# Patient Record
Sex: MALE | Race: OTHER
[De-identification: names, ages, dates, MRNs, and addresses within clinical notes are randomized per-mention and may not be internally consistent; named-entity substitution may affect disease eponyms.]

---

## 2017-03-04 ENCOUNTER — APPOINTMENT (OUTPATIENT)
Dept: FAMILY MEDICINE | Facility: CLINIC | Age: 52
End: 2017-03-04

## 2017-03-04 VITALS
HEIGHT: 66 IN | WEIGHT: 185 LBS | OXYGEN SATURATION: 98 % | DIASTOLIC BLOOD PRESSURE: 93 MMHG | TEMPERATURE: 97.6 F | SYSTOLIC BLOOD PRESSURE: 157 MMHG | HEART RATE: 72 BPM | BODY MASS INDEX: 29.73 KG/M2

## 2017-03-04 VITALS — SYSTOLIC BLOOD PRESSURE: 146 MMHG | DIASTOLIC BLOOD PRESSURE: 98 MMHG

## 2017-03-04 LAB — HBA1C MFR BLD HPLC: 7.2

## 2017-05-04 ENCOUNTER — MEDICATION RENEWAL (OUTPATIENT)
Age: 52
End: 2017-05-04

## 2017-05-15 LAB
ALBUMIN SERPL ELPH-MCNC: 4.6 G/DL
ALP BLD-CCNC: 99 U/L
ALT SERPL-CCNC: 40 U/L
ANION GAP SERPL CALC-SCNC: 15 MMOL/L
AST SERPL-CCNC: 26 U/L
BILIRUB SERPL-MCNC: 0.3 MG/DL
BUN SERPL-MCNC: 26 MG/DL
CALCIUM SERPL-MCNC: 9.6 MG/DL
CHLORIDE SERPL-SCNC: 99 MMOL/L
CO2 SERPL-SCNC: 24 MMOL/L
CREAT SERPL-MCNC: 0.99 MG/DL
GLUCOSE SERPL-MCNC: 93 MG/DL
POTASSIUM SERPL-SCNC: 3.9 MMOL/L
PROT SERPL-MCNC: 7.7 G/DL
SODIUM SERPL-SCNC: 138 MMOL/L

## 2017-06-06 ENCOUNTER — MEDICATION RENEWAL (OUTPATIENT)
Age: 52
End: 2017-06-06

## 2017-06-09 ENCOUNTER — RESULT CHARGE (OUTPATIENT)
Age: 52
End: 2017-06-09

## 2017-06-10 ENCOUNTER — APPOINTMENT (OUTPATIENT)
Dept: FAMILY MEDICINE | Facility: CLINIC | Age: 52
End: 2017-06-10

## 2017-06-10 ENCOUNTER — MED ADMIN CHARGE (OUTPATIENT)
Age: 52
End: 2017-06-10

## 2017-06-10 VITALS
DIASTOLIC BLOOD PRESSURE: 86 MMHG | WEIGHT: 184 LBS | OXYGEN SATURATION: 98 % | BODY MASS INDEX: 29.57 KG/M2 | TEMPERATURE: 98.4 F | SYSTOLIC BLOOD PRESSURE: 127 MMHG | HEART RATE: 72 BPM | HEIGHT: 66 IN

## 2017-06-12 LAB
25(OH)D3 SERPL-MCNC: 18.8 NG/ML
CHOLEST SERPL-MCNC: 179 MG/DL
CHOLEST/HDLC SERPL: 4.8 RATIO
HDLC SERPL-MCNC: 37 MG/DL
LDLC SERPL CALC-MCNC: 129 MG/DL
TRIGL SERPL-MCNC: 67 MG/DL

## 2017-08-07 ENCOUNTER — RX RENEWAL (OUTPATIENT)
Age: 52
End: 2017-08-07

## 2017-09-16 ENCOUNTER — APPOINTMENT (OUTPATIENT)
Dept: FAMILY MEDICINE | Facility: CLINIC | Age: 52
End: 2017-09-16
Payer: COMMERCIAL

## 2017-09-16 ENCOUNTER — RESULT CHARGE (OUTPATIENT)
Age: 52
End: 2017-09-16

## 2017-09-16 VITALS
SYSTOLIC BLOOD PRESSURE: 150 MMHG | HEIGHT: 66 IN | DIASTOLIC BLOOD PRESSURE: 92 MMHG | WEIGHT: 189 LBS | HEART RATE: 77 BPM | BODY MASS INDEX: 30.37 KG/M2 | TEMPERATURE: 98.3 F | OXYGEN SATURATION: 96 %

## 2017-09-16 LAB — HBA1C MFR BLD HPLC: 7.1

## 2017-09-16 PROCEDURE — 99213 OFFICE O/P EST LOW 20 MIN: CPT | Mod: 25

## 2017-09-16 PROCEDURE — 83036 HEMOGLOBIN GLYCOSYLATED A1C: CPT | Mod: QW

## 2017-10-09 ENCOUNTER — RX RENEWAL (OUTPATIENT)
Age: 52
End: 2017-10-09

## 2017-11-02 ENCOUNTER — RESULT CHARGE (OUTPATIENT)
Age: 52
End: 2017-11-02

## 2017-11-03 ENCOUNTER — APPOINTMENT (OUTPATIENT)
Dept: FAMILY MEDICINE | Facility: CLINIC | Age: 52
End: 2017-11-03
Payer: COMMERCIAL

## 2017-11-03 VITALS
HEIGHT: 66 IN | HEART RATE: 71 BPM | BODY MASS INDEX: 30.22 KG/M2 | TEMPERATURE: 98 F | SYSTOLIC BLOOD PRESSURE: 135 MMHG | OXYGEN SATURATION: 96 % | WEIGHT: 188 LBS | DIASTOLIC BLOOD PRESSURE: 87 MMHG

## 2017-11-03 DIAGNOSIS — Z92.29 PERSONAL HISTORY OF OTHER DRUG THERAPY: ICD-10-CM

## 2017-11-03 PROCEDURE — 99396 PREV VISIT EST AGE 40-64: CPT | Mod: 25

## 2017-11-03 PROCEDURE — 93000 ELECTROCARDIOGRAM COMPLETE: CPT

## 2017-11-03 PROCEDURE — 90471 IMMUNIZATION ADMIN: CPT

## 2017-11-03 PROCEDURE — 90715 TDAP VACCINE 7 YRS/> IM: CPT

## 2017-11-03 PROCEDURE — 36415 COLL VENOUS BLD VENIPUNCTURE: CPT

## 2017-11-06 ENCOUNTER — MEDICATION RENEWAL (OUTPATIENT)
Age: 52
End: 2017-11-06

## 2017-11-06 LAB
25(OH)D3 SERPL-MCNC: 19.8 NG/ML
ALBUMIN SERPL ELPH-MCNC: 4.6 G/DL
ALP BLD-CCNC: 92 U/L
ALT SERPL-CCNC: 31 U/L
ANION GAP SERPL CALC-SCNC: 16 MMOL/L
APPEARANCE: CLEAR
AST SERPL-CCNC: 23 U/L
BACTERIA: NEGATIVE
BASOPHILS # BLD AUTO: 0.02 K/UL
BASOPHILS NFR BLD AUTO: 0.3 %
BILIRUB SERPL-MCNC: 0.4 MG/DL
BILIRUBIN URINE: NEGATIVE
BLOOD URINE: NEGATIVE
BUN SERPL-MCNC: 23 MG/DL
CALCIUM SERPL-MCNC: 10.2 MG/DL
CHLORIDE SERPL-SCNC: 101 MMOL/L
CHOLEST SERPL-MCNC: 219 MG/DL
CHOLEST/HDLC SERPL: 6.3 RATIO
CO2 SERPL-SCNC: 22 MMOL/L
COLOR: YELLOW
CREAT SERPL-MCNC: 1.06 MG/DL
EOSINOPHIL # BLD AUTO: 0.17 K/UL
EOSINOPHIL NFR BLD AUTO: 2.9 %
GLUCOSE QUALITATIVE U: NEGATIVE MG/DL
GLUCOSE SERPL-MCNC: 122 MG/DL
HCT VFR BLD CALC: 44.3 %
HDLC SERPL-MCNC: 35 MG/DL
HGB BLD-MCNC: 15 G/DL
HIV1+2 AB SPEC QL IA.RAPID: NONREACTIVE
HYALINE CASTS: 1 /LPF
IMM GRANULOCYTES NFR BLD AUTO: 0 %
KETONES URINE: NEGATIVE
LDLC SERPL CALC-MCNC: 166 MG/DL
LEUKOCYTE ESTERASE URINE: NEGATIVE
LYMPHOCYTES # BLD AUTO: 2.21 K/UL
LYMPHOCYTES NFR BLD AUTO: 37.6 %
MAN DIFF?: NORMAL
MCHC RBC-ENTMCNC: 29.7 PG
MCHC RBC-ENTMCNC: 33.9 GM/DL
MCV RBC AUTO: 87.7 FL
MICROSCOPIC-UA: NORMAL
MONOCYTES # BLD AUTO: 0.46 K/UL
MONOCYTES NFR BLD AUTO: 7.8 %
NEUTROPHILS # BLD AUTO: 3.02 K/UL
NEUTROPHILS NFR BLD AUTO: 51.4 %
NITRITE URINE: NEGATIVE
PH URINE: 5
PLATELET # BLD AUTO: 166 K/UL
POTASSIUM SERPL-SCNC: 4.2 MMOL/L
PROT SERPL-MCNC: 8 G/DL
PROTEIN URINE: NEGATIVE MG/DL
PSA SERPL-MCNC: 0.38 NG/ML
RBC # BLD: 5.05 M/UL
RBC # FLD: 12.9 %
RED BLOOD CELLS URINE: 1 /HPF
SODIUM SERPL-SCNC: 139 MMOL/L
SPECIFIC GRAVITY URINE: 1.02
SQUAMOUS EPITHELIAL CELLS: 0 /HPF
TRIGL SERPL-MCNC: 89 MG/DL
TSH SERPL-ACNC: 1.5 UIU/ML
UROBILINOGEN URINE: NEGATIVE MG/DL
WBC # FLD AUTO: 5.88 K/UL
WHITE BLOOD CELLS URINE: 0 /HPF

## 2017-11-20 ENCOUNTER — RX RENEWAL (OUTPATIENT)
Age: 52
End: 2017-11-20

## 2018-02-12 ENCOUNTER — RX RENEWAL (OUTPATIENT)
Age: 53
End: 2018-02-12

## 2018-03-12 ENCOUNTER — APPOINTMENT (OUTPATIENT)
Dept: FAMILY MEDICINE | Facility: CLINIC | Age: 53
End: 2018-03-12
Payer: COMMERCIAL

## 2018-03-12 VITALS
WEIGHT: 184 LBS | TEMPERATURE: 97.9 F | DIASTOLIC BLOOD PRESSURE: 89 MMHG | OXYGEN SATURATION: 97 % | HEART RATE: 87 BPM | SYSTOLIC BLOOD PRESSURE: 130 MMHG | HEIGHT: 66 IN | BODY MASS INDEX: 29.57 KG/M2

## 2018-03-12 PROCEDURE — 36415 COLL VENOUS BLD VENIPUNCTURE: CPT

## 2018-03-12 PROCEDURE — 99213 OFFICE O/P EST LOW 20 MIN: CPT | Mod: 25

## 2018-03-12 RX ORDER — TADALAFIL 5 MG/1
5 TABLET, FILM COATED ORAL
Qty: 30 | Refills: 6 | Status: COMPLETED | COMMUNITY
Start: 2017-11-03 | End: 2018-03-12

## 2018-03-22 LAB
25(OH)D3 SERPL-MCNC: 28.8 NG/ML
ALBUMIN SERPL ELPH-MCNC: 4.8 G/DL
ALP BLD-CCNC: 86 U/L
ALT SERPL-CCNC: 26 U/L
ANION GAP SERPL CALC-SCNC: 18 MMOL/L
AST SERPL-CCNC: 23 U/L
BILIRUB SERPL-MCNC: 0.3 MG/DL
BUN SERPL-MCNC: 18 MG/DL
CALCIUM SERPL-MCNC: 10.4 MG/DL
CHLORIDE SERPL-SCNC: 100 MMOL/L
CO2 SERPL-SCNC: 23 MMOL/L
CREAT SERPL-MCNC: 1.08 MG/DL
GLUCOSE SERPL-MCNC: 99 MG/DL
POTASSIUM SERPL-SCNC: 4.2 MMOL/L
PROT SERPL-MCNC: 7.9 G/DL
SODIUM SERPL-SCNC: 141 MMOL/L

## 2018-06-09 ENCOUNTER — RX RENEWAL (OUTPATIENT)
Age: 53
End: 2018-06-09

## 2018-10-13 ENCOUNTER — APPOINTMENT (OUTPATIENT)
Dept: FAMILY MEDICINE | Facility: CLINIC | Age: 53
End: 2018-10-13

## 2018-12-14 ENCOUNTER — APPOINTMENT (OUTPATIENT)
Dept: FAMILY MEDICINE | Facility: CLINIC | Age: 53
End: 2018-12-14
Payer: COMMERCIAL

## 2018-12-14 VITALS
OXYGEN SATURATION: 99 % | SYSTOLIC BLOOD PRESSURE: 156 MMHG | TEMPERATURE: 98.2 F | BODY MASS INDEX: 29.09 KG/M2 | HEIGHT: 66 IN | DIASTOLIC BLOOD PRESSURE: 116 MMHG | WEIGHT: 181 LBS | HEART RATE: 69 BPM

## 2018-12-14 VITALS — SYSTOLIC BLOOD PRESSURE: 151 MMHG | DIASTOLIC BLOOD PRESSURE: 97 MMHG

## 2018-12-14 PROCEDURE — 99213 OFFICE O/P EST LOW 20 MIN: CPT

## 2019-01-23 ENCOUNTER — APPOINTMENT (OUTPATIENT)
Dept: FAMILY MEDICINE | Facility: CLINIC | Age: 54
End: 2019-01-23
Payer: COMMERCIAL

## 2019-01-23 VITALS
SYSTOLIC BLOOD PRESSURE: 143 MMHG | WEIGHT: 181 LBS | TEMPERATURE: 98.4 F | HEIGHT: 66 IN | HEART RATE: 65 BPM | BODY MASS INDEX: 29.09 KG/M2 | DIASTOLIC BLOOD PRESSURE: 93 MMHG | OXYGEN SATURATION: 98 %

## 2019-01-23 DIAGNOSIS — Z87.09 PERSONAL HISTORY OF OTHER DISEASES OF THE RESPIRATORY SYSTEM: ICD-10-CM

## 2019-01-23 DIAGNOSIS — B35.3 TINEA PEDIS: ICD-10-CM

## 2019-01-23 DIAGNOSIS — Z87.19 PERSONAL HISTORY OF OTHER DISEASES OF THE DIGESTIVE SYSTEM: ICD-10-CM

## 2019-01-23 DIAGNOSIS — Z23 ENCOUNTER FOR IMMUNIZATION: ICD-10-CM

## 2019-01-23 LAB — HBA1C MFR BLD HPLC: 8.8

## 2019-01-23 PROCEDURE — G0405: CPT

## 2019-01-23 PROCEDURE — 93000 ELECTROCARDIOGRAM COMPLETE: CPT | Mod: 59

## 2019-01-23 PROCEDURE — 99396 PREV VISIT EST AGE 40-64: CPT | Mod: 25

## 2019-01-23 PROCEDURE — 83036 HEMOGLOBIN GLYCOSYLATED A1C: CPT | Mod: QW

## 2019-01-23 PROCEDURE — 36415 COLL VENOUS BLD VENIPUNCTURE: CPT

## 2019-01-23 RX ORDER — LOSARTAN POTASSIUM AND HYDROCHLOROTHIAZIDE 12.5; 5 MG/1; MG/1
50-12.5 TABLET ORAL DAILY
Qty: 30 | Refills: 3 | Status: DISCONTINUED | COMMUNITY
Start: 2017-03-04 | End: 2019-01-23

## 2019-01-23 RX ORDER — TERBINAFINE HYDROCHLORIDE 250 MG/1
250 TABLET ORAL
Qty: 30 | Refills: 1 | Status: COMPLETED | COMMUNITY
Start: 2017-11-03 | End: 2019-01-23

## 2019-01-23 RX ORDER — GUAIFENESIN AND DEXTROMETHORPHAN HYDROBROMIDE 200; 10 MG/5ML; MG/5ML
10-200 LIQUID ORAL 4 TIMES DAILY
Qty: 1 | Refills: 1 | Status: COMPLETED | COMMUNITY
Start: 2018-12-14 | End: 2019-01-23

## 2019-01-23 NOTE — ASSESSMENT
[FreeTextEntry1] : This is a 52 y/o male with PMHx including HTN, HLD, DM2 uncontrolled, IRBBB, Vitamin D insufficiency, presenting to the office for CPE\par \par CVS: h/o HTN, HLD, IRBBB\par -Blood pressure elevated, did not take meds today.\par -Formerly on Losartan 50/12.5,switched to Irbesartan secondary to Losartan recall, Pravastatin 10 mg\par -Diet and exercise discussed.\par \par Endo: h/o DM2 uncontrolled\par -HgbA1c 8.8 today\par -Pt was only taking Pioglitazone/Metformin once daily instead of bid, encouraged to follow up with instructions.\par -Diet and exercise discussed\par -Blood sugar monitoring at least 3 x a week.\par \par : h/o erectile dysfunction\par -Renewed Sildenafil 20 mg\par -Will check Testosterone level and Prolactin.\par \par HCM:\par -Has not had Flu vaccine yet, encouraged to get it at his local pharmacy\par -Fasting blood work today\par -Received Tetanus Vaccine 11/17\par -Received Pneumonia Vaccine 6/17 (pneumovax)\par -Colonoscopy 9/2016, recommend repeat 2026\par -Ophthalmology referral given for diabetic eye exam\par -Cardiology referral for disease stratification (diabetes /Htn/ Abnormal EKG)\par

## 2019-01-23 NOTE — PHYSICAL EXAM
[No Acute Distress] : no acute distress [Well Nourished] : well nourished [Well Developed] : well developed [Well-Appearing] : well-appearing [Normal Nasal Mucosa] : the nasal mucosa was normal [No JVD] : no jugular venous distention [Supple] : supple [No Lymphadenopathy] : no lymphadenopathy [No Respiratory Distress] : no respiratory distress  [Clear to Auscultation] : lungs were clear to auscultation bilaterally [No Accessory Muscle Use] : no accessory muscle use [Normal Rate] : normal rate  [Regular Rhythm] : with a regular rhythm [Normal S1, S2] : normal S1 and S2 [No Murmur] : no murmur heard [Soft] : abdomen soft [Normal Bowel Sounds] : normal bowel sounds [de-identified] : Nares erythematous and boggy, some purulent rhinorrhea seen. Posterior pharynx slightly erythematous, no tonsillar enlargement

## 2019-01-23 NOTE — COUNSELING
[Weight management counseling provided] : Weight management [Healthy eating counseling provided] : healthy eating [Activity counseling provided] : activity [Target Wt Loss Goal ___] : Target weight loss goal [unfilled] lbs [___ min/wk activity recommended] : [unfilled] min/wk activity recommended [None] : None [Good understanding] : Patient has a good understanding of lifestyle changes and the steps needed to achieve self management goals

## 2019-01-23 NOTE — REVIEW OF SYSTEMS
[Fever] : no fever [Chills] : no chills [Night Sweats] : no night sweats [Postnasal Drip] : postnasal drip [Nasal Discharge] : nasal discharge [Wheezing] : wheezing [Cough] : cough [Negative] : Musculoskeletal

## 2019-01-23 NOTE — HISTORY OF PRESENT ILLNESS
[FreeTextEntry8] : 1 1/2 mo h/o cough with mild productive sputum, + wheezing, + sick contacts at home. Has tried OTC cough syrup. Pt returned from [facility] 11/11/18 he was there for 3 weeks after the death of his father.

## 2019-01-23 NOTE — ASSESSMENT
[FreeTextEntry1] : Will start him on azithromycin for 5 days, diabetic cough syrup.\par \par Blood pressure consistently elevated, will start Irbesartan 150 mg daily.\par Diet and exercise discussed for better glycemic control\par \par Renew Pravastatin, Pioglitazone, Vitamin D \par \par RTO for CPE at earliest convenience

## 2019-01-23 NOTE — HISTORY OF PRESENT ILLNESS
[FreeTextEntry1] : Physical exam [de-identified] : This is a 52 y/o male with PMHx including HTN, HLD, DM2 uncontrolled, IRBBB, Vitamin D insufficiency, presenting to the office for CPE, no acute complains.

## 2019-01-23 NOTE — HEALTH RISK ASSESSMENT
[Good] : ~his/her~  mood as  good [No falls in past year] : Patient reported no falls in the past year [0] : 2) Feeling down, depressed, or hopeless: Not at all (0) [Hepatitis C test offered] : Hepatitis C test offered [None] : None [With Family] : lives with family [# of Members in Household ___] :  household currently consist of [unfilled] member(s) [Employed] : employed [High School] : high school [] :  [# Of Children ___] : has [unfilled] children [Sexually Active] : sexually active [Feels Safe at Home] : Feels safe at home [Fully functional (bathing, dressing, toileting, transferring, walking, feeding)] : Fully functional (bathing, dressing, toileting, transferring, walking, feeding) [Fully functional (using the telephone, shopping, preparing meals, housekeeping, doing laundry, using] : Fully functional and needs no help or supervision to perform IADLs (using the telephone, shopping, preparing meals, housekeeping, doing laundry, using transportation, managing medications and managing finances) [Smoke Detector] : smoke detector [Carbon Monoxide Detector] : carbon monoxide detector [Seat Belt] :  uses seat belt [Travel to Developing Areas] : travel to developing areas [Discussed at today's visit] : Advance Directives Discussed at today's visit [] : No [[id number]] : 0 [Change in mental status noted] : No change in mental status noted [Language] : denies difficulty with language [Behavior] : denies difficulty with behavior [Learning/Retaining New Information] : denies difficulty learning/retaining new information [Handling Complex Tasks] : denies difficulty handling complex tasks [Reasoning] : denies difficulty with reasoning [Spatial Ability and Orientation] : denies difficulty with spatial ability and orientation [High Risk Behavior] : no high risk behavior [Reports changes in hearing] : Reports no changes in hearing [Reports changes in vision] : Reports no changes in vision [Reports changes in dental health] : Reports no changes in dental health [Guns at Home] : no guns at home [Sunscreen] : does not use sunscreen [TB Exposure] : is not being exposed to tuberculosis [Caregiver Concerns] : does not have caregiver concerns [ColonoscopyDate] : 09/2016 [ColonoscopyComments] : Report in EMR, return in 10 years [HIVDate] : 11/17 [de-identified] : full time [FreeTextEntry2] :  AND

## 2019-01-24 LAB
ALBUMIN SERPL ELPH-MCNC: 4.9 G/DL
ALP BLD-CCNC: 106 U/L
ALT SERPL-CCNC: 63 U/L
ANION GAP SERPL CALC-SCNC: 12 MMOL/L
APPEARANCE: CLEAR
AST SERPL-CCNC: 38 U/L
BACTERIA: NEGATIVE
BASOPHILS # BLD AUTO: 0.03 K/UL
BASOPHILS NFR BLD AUTO: 0.5 %
BILIRUB SERPL-MCNC: 0.4 MG/DL
BILIRUBIN URINE: NEGATIVE
BLOOD URINE: NEGATIVE
BUN SERPL-MCNC: 17 MG/DL
CALCIUM SERPL-MCNC: 10.5 MG/DL
CHLORIDE SERPL-SCNC: 98 MMOL/L
CHOLEST SERPL-MCNC: 162 MG/DL
CHOLEST/HDLC SERPL: 4 RATIO
CO2 SERPL-SCNC: 28 MMOL/L
COLOR: YELLOW
CREAT SERPL-MCNC: 1.01 MG/DL
EOSINOPHIL # BLD AUTO: 0.23 K/UL
EOSINOPHIL NFR BLD AUTO: 3.9 %
GLUCOSE QUALITATIVE U: NEGATIVE MG/DL
GLUCOSE SERPL-MCNC: 150 MG/DL
HCT VFR BLD CALC: 44.8 %
HCV AB SER QL: NONREACTIVE
HCV S/CO RATIO: 0.07 S/CO
HDLC SERPL-MCNC: 41 MG/DL
HGB BLD-MCNC: 15.4 G/DL
HYALINE CASTS: 0 /LPF
IMM GRANULOCYTES NFR BLD AUTO: 0.2 %
KETONES URINE: NEGATIVE
LDLC SERPL CALC-MCNC: 104 MG/DL
LEUKOCYTE ESTERASE URINE: NEGATIVE
LYMPHOCYTES # BLD AUTO: 2.14 K/UL
LYMPHOCYTES NFR BLD AUTO: 36.5 %
MAN DIFF?: NORMAL
MCHC RBC-ENTMCNC: 29.3 PG
MCHC RBC-ENTMCNC: 34.4 GM/DL
MCV RBC AUTO: 85.2 FL
MICROSCOPIC-UA: NORMAL
MONOCYTES # BLD AUTO: 0.38 K/UL
MONOCYTES NFR BLD AUTO: 6.5 %
NEUTROPHILS # BLD AUTO: 3.07 K/UL
NEUTROPHILS NFR BLD AUTO: 52.4 %
NITRITE URINE: NEGATIVE
PH URINE: 6
PLATELET # BLD AUTO: 162 K/UL
POTASSIUM SERPL-SCNC: 4.3 MMOL/L
PROT SERPL-MCNC: 8 G/DL
PROTEIN URINE: NEGATIVE MG/DL
PSA SERPL-MCNC: 0.3 NG/ML
RBC # BLD: 5.26 M/UL
RBC # FLD: 13.6 %
RED BLOOD CELLS URINE: 1 /HPF
SODIUM SERPL-SCNC: 138 MMOL/L
SPECIFIC GRAVITY URINE: 1.02
SQUAMOUS EPITHELIAL CELLS: 0 /HPF
TESTOST SERPL-MCNC: 705.9 NG/DL
TRIGL SERPL-MCNC: 83 MG/DL
TSH SERPL-ACNC: 1.68 UIU/ML
UROBILINOGEN URINE: NEGATIVE MG/DL
WBC # FLD AUTO: 5.86 K/UL
WHITE BLOOD CELLS URINE: 1 /HPF

## 2019-01-25 LAB
25(OH)D3 SERPL-MCNC: 27.6 NG/ML
PROLACTIN SERPL-MCNC: 8.6 NG/ML

## 2019-02-02 ENCOUNTER — MEDICATION RENEWAL (OUTPATIENT)
Age: 54
End: 2019-02-02

## 2019-02-15 ENCOUNTER — APPOINTMENT (OUTPATIENT)
Dept: CARDIOLOGY | Facility: CLINIC | Age: 54
End: 2019-02-15

## 2019-03-19 ENCOUNTER — NON-APPOINTMENT (OUTPATIENT)
Age: 54
End: 2019-03-19

## 2019-03-19 ENCOUNTER — APPOINTMENT (OUTPATIENT)
Dept: CARDIOLOGY | Facility: CLINIC | Age: 54
End: 2019-03-19
Payer: COMMERCIAL

## 2019-03-19 VITALS
DIASTOLIC BLOOD PRESSURE: 93 MMHG | SYSTOLIC BLOOD PRESSURE: 160 MMHG | HEART RATE: 69 BPM | HEIGHT: 66 IN | OXYGEN SATURATION: 98 % | BODY MASS INDEX: 30.86 KG/M2 | WEIGHT: 192 LBS

## 2019-03-19 VITALS — SYSTOLIC BLOOD PRESSURE: 143 MMHG | DIASTOLIC BLOOD PRESSURE: 93 MMHG

## 2019-03-19 PROCEDURE — 99245 OFF/OP CONSLTJ NEW/EST HI 55: CPT

## 2019-03-19 PROCEDURE — 93000 ELECTROCARDIOGRAM COMPLETE: CPT

## 2019-03-19 RX ORDER — IRBESARTAN 150 MG/1
150 TABLET ORAL DAILY
Qty: 30 | Refills: 3 | Status: DISCONTINUED | COMMUNITY
Start: 2018-12-14 | End: 2019-03-19

## 2019-03-19 NOTE — REVIEW OF SYSTEMS
[see HPI] : see HPI [Dizziness] : dizziness [Tremor] : no tremor was seen [Numbness (Hypesthesia)] : no numbness [Convulsions] : no convulsions [Tingling (Paresthesia)] : no tingling [Negative] : Heme/Lymph

## 2019-03-19 NOTE — DISCUSSION/SUMMARY
[Patient] : the patient [Risks] : risks [Benefits] : benefits [Alternatives] : alternatives [___ Month(s)] : [unfilled] month(s) [With Me] : with me [FreeTextEntry1] : This is a 53 year old male with hypertension and dyslipidemia and Diabetes Mellitus here for coronary artery disease evaluation\par 1) coronary artery disease evaluation: Risk factors for coronary artery disease . stress echo, 2D echo. aspirin 81 mg daily. \par 2) dizziness: carotid  ultrasound. 2D echo,. \par 3) dyslipidemia : ct statins. LDL : reviewed. pravastatin 10 mg bedtime. \par 4) hypertension: uncontrolled.  increase the losartan-HCTZ to 100/25 mg daily. restrict salt intake.

## 2019-03-19 NOTE — REASON FOR VISIT
[Initial Evaluation] : an initial evaluation of [FreeTextEntry2] : coronary artery disease evaluation, hypertension and dyslipidemia , Diabetes Mellitus  [FreeTextEntry1] : coronary artery disease evaluation, hypertension and dyslipidemia , Diabetes Mellitus

## 2019-03-19 NOTE — HISTORY OF PRESENT ILLNESS
[FreeTextEntry1] : coronary artery disease evaluation, hypertension and dyslipidemia , Diabetes Mellitus \par \par This is a 53 year old male with history of hypertension and dyslipidemia and Diabetes Mellitus here with dizziness and coronary artery disease evaluation.\par \par patient has been having intermittent dizziness, not every day some times. no syncope. HE thinks its his high blood pressure. \par No ches tpain. no dyspnea. he mentions that his primary doctor is concernves about his cardiac status and want him to be evaluated for coronary artery disease .\par he does not exercise, bnut he is active and has 2  jobs. Sleeps 5 hrs a night.\par

## 2019-03-19 NOTE — PHYSICAL EXAM
[General Appearance - Well Developed] : well developed [Well Groomed] : well groomed [Normal Appearance] : normal appearance [General Appearance - Well Nourished] : well nourished [No Deformities] : no deformities [General Appearance - In No Acute Distress] : no acute distress [Normal Conjunctiva] : the conjunctiva exhibited no abnormalities [Normal Oral Mucosa] : normal oral mucosa [Eyelids - No Xanthelasma] : the eyelids demonstrated no xanthelasmas [No Oral Pallor] : no oral pallor [No Oral Cyanosis] : no oral cyanosis [Normal Jugular Venous A Waves Present] : normal jugular venous A waves present [Normal Jugular Venous V Waves Present] : normal jugular venous V waves present [No Jugular Venous Coronel A Waves] : no jugular venous coronel A waves [Heart Rate And Rhythm] : heart rate and rhythm were normal [Heart Sounds] : normal S1 and S2 [Murmurs] : no murmurs present [Respiration, Rhythm And Depth] : normal respiratory rhythm and effort [Exaggerated Use Of Accessory Muscles For Inspiration] : no accessory muscle use [Auscultation Breath Sounds / Voice Sounds] : lungs were clear to auscultation bilaterally [Abdomen Tenderness] : non-tender [Abdomen Soft] : soft [Abdomen Mass (___ Cm)] : no abdominal mass palpated [Abnormal Walk] : normal gait [Gait - Sufficient For Exercise Testing] : the gait was sufficient for exercise testing [Nail Clubbing] : no clubbing of the fingernails [Cyanosis, Localized] : no localized cyanosis [Petechial Hemorrhages (___cm)] : no petechial hemorrhages [Skin Color & Pigmentation] : normal skin color and pigmentation [] : no rash [No Venous Stasis] : no venous stasis [Skin Lesions] : no skin lesions [No Skin Ulcers] : no skin ulcer [No Xanthoma] : no  xanthoma was observed [Oriented To Time, Place, And Person] : oriented to person, place, and time [Affect] : the affect was normal [Mood] : the mood was normal [No Anxiety] : not feeling anxious

## 2019-03-22 ENCOUNTER — CLINICAL ADVICE (OUTPATIENT)
Age: 54
End: 2019-03-22

## 2019-03-29 ENCOUNTER — APPOINTMENT (OUTPATIENT)
Dept: CARDIOLOGY | Facility: CLINIC | Age: 54
End: 2019-03-29
Payer: COMMERCIAL

## 2019-03-29 PROCEDURE — 93306 TTE W/DOPPLER COMPLETE: CPT

## 2019-03-29 PROCEDURE — 93880 EXTRACRANIAL BILAT STUDY: CPT

## 2019-04-04 ENCOUNTER — FORM ENCOUNTER (OUTPATIENT)
Age: 54
End: 2019-04-04

## 2019-04-05 ENCOUNTER — OUTPATIENT (OUTPATIENT)
Dept: OUTPATIENT SERVICES | Facility: HOSPITAL | Age: 54
LOS: 1 days | End: 2019-04-05
Payer: COMMERCIAL

## 2019-04-05 DIAGNOSIS — E78.5 HYPERLIPIDEMIA, UNSPECIFIED: ICD-10-CM

## 2019-04-05 DIAGNOSIS — I10 ESSENTIAL (PRIMARY) HYPERTENSION: ICD-10-CM

## 2019-04-05 DIAGNOSIS — I20.8 OTHER FORMS OF ANGINA PECTORIS: ICD-10-CM

## 2019-04-05 DIAGNOSIS — E11.9 TYPE 2 DIABETES MELLITUS WITHOUT COMPLICATIONS: ICD-10-CM

## 2019-04-05 LAB
BUN SERPL-MCNC: 22 MG/DL — HIGH (ref 8–20)
CREAT SERPL-MCNC: 0.94 MG/DL — SIGNIFICANT CHANGE UP (ref 0.5–1.3)

## 2019-04-05 PROCEDURE — 75574 CT ANGIO HRT W/3D IMAGE: CPT

## 2019-04-05 PROCEDURE — 36415 COLL VENOUS BLD VENIPUNCTURE: CPT

## 2019-04-05 PROCEDURE — 84520 ASSAY OF UREA NITROGEN: CPT

## 2019-04-05 PROCEDURE — 75574 CT ANGIO HRT W/3D IMAGE: CPT | Mod: 26

## 2019-04-05 PROCEDURE — 82565 ASSAY OF CREATININE: CPT

## 2019-04-13 ENCOUNTER — APPOINTMENT (OUTPATIENT)
Dept: FAMILY MEDICINE | Facility: CLINIC | Age: 54
End: 2019-04-13
Payer: COMMERCIAL

## 2019-04-13 VITALS
DIASTOLIC BLOOD PRESSURE: 86 MMHG | TEMPERATURE: 98 F | WEIGHT: 188 LBS | HEIGHT: 66 IN | SYSTOLIC BLOOD PRESSURE: 125 MMHG | BODY MASS INDEX: 30.22 KG/M2 | OXYGEN SATURATION: 99 % | HEART RATE: 65 BPM

## 2019-04-13 DIAGNOSIS — Z86.19 PERSONAL HISTORY OF OTHER INFECTIOUS AND PARASITIC DISEASES: ICD-10-CM

## 2019-04-13 PROCEDURE — 99214 OFFICE O/P EST MOD 30 MIN: CPT | Mod: 25

## 2019-04-13 PROCEDURE — 83036 HEMOGLOBIN GLYCOSYLATED A1C: CPT | Mod: QW

## 2019-04-13 RX ORDER — METOPROLOL TARTRATE 25 MG/1
25 TABLET, FILM COATED ORAL
Qty: 2 | Refills: 0 | Status: COMPLETED | COMMUNITY
Start: 2019-03-19 | End: 2019-04-13

## 2019-04-13 NOTE — ASSESSMENT
[FreeTextEntry1] : This is a 52 y/o male with PMHx including HTN, HLD, DM2 uncontrolled, IRBBB, Vitamin D insufficiency, presenting to the office for Diabetes follow up.\par \par CVS: h/o HTN, HLD, IRBBB\par -Blood pressure well controlled.\par -Losartan increased to 100/25 by Cardiology Dr Zapata.\par -Continue Pravastatin 10 mg\par -Pt underwent CT Angio on 4/5/19, found with nml coronaries, Calcium score 0.25\par -Echocardiogram performed March 2019, EF 57%, no significant valvular abnormality, Carotid Doppler with no significant stenosis.\par -Diet and exercise discussed.\par \par Endo: h/o DM2 uncontrolled\par -HgbA1c 8.6 today,down from 8.8\par -Pt taking Pioglitazone/Metformin bid, will add Jardiance 25 mg daily.\par -Diet and exercise discussed\par -Blood sugar monitoring at least 3 x a week.\par \par : h/o erectile dysfunction\par -Renewed Sildenafil 20 mg\par -Testosterone level and Prolactin levels wnl.\par \par HCM:\par -Did not get flu vaccine.\par -Received Tetanus Vaccine 11/17\par -Received Pneumonia Vaccine 6/17 (pneumovax)\par -Colonoscopy 9/2016, recommend repeat 2026\par -Ophthalmology referral given for diabetic eye exam, appointment scheduled for May.\par

## 2019-04-13 NOTE — HEALTH RISK ASSESSMENT
[No falls in past year] : Patient reported no falls in the past year [0] : 2) Feeling down, depressed, or hopeless: Not at all (0) [] : No [[id number]] : 0

## 2019-04-13 NOTE — PHYSICAL EXAM
[Well Nourished] : well nourished [No Acute Distress] : no acute distress [No Respiratory Distress] : no respiratory distress  [Well-Appearing] : well-appearing [Well Developed] : well developed [Normal Rate] : normal rate  [Clear to Auscultation] : lungs were clear to auscultation bilaterally [No Accessory Muscle Use] : no accessory muscle use [Normal S1, S2] : normal S1 and S2 [Regular Rhythm] : with a regular rhythm [No Edema] : there was no peripheral edema [Normal Bowel Sounds] : normal bowel sounds [Soft] : abdomen soft [No Murmur] : no murmur heard

## 2019-04-13 NOTE — COUNSELING
[Target Wt Loss Goal ___] : Target weight loss goal [unfilled] lbs [Activity counseling provided] : activity [Weight management counseling provided] : Weight management [Healthy eating counseling provided] : healthy eating [___ min/wk activity recommended] : [unfilled] min/wk activity recommended [None] : None [Good understanding] : Patient has a good understanding of lifestyle changes and the steps needed to achieve self management goals

## 2019-04-13 NOTE — HISTORY OF PRESENT ILLNESS
[FreeTextEntry1] : Diabetes follow up [de-identified] : This is a 54 y/o male with PMHx including HTN, HLD, DM2 uncontrolled, IRBBB, Vitamin D insufficiency, presenting to the office for diabetes follow up, no acute complains.

## 2019-04-15 LAB — HBA1C MFR BLD HPLC: 8.5

## 2019-06-19 ENCOUNTER — NON-APPOINTMENT (OUTPATIENT)
Age: 54
End: 2019-06-19

## 2019-06-19 ENCOUNTER — APPOINTMENT (OUTPATIENT)
Dept: CARDIOLOGY | Facility: CLINIC | Age: 54
End: 2019-06-19
Payer: COMMERCIAL

## 2019-06-19 VITALS
SYSTOLIC BLOOD PRESSURE: 122 MMHG | RESPIRATION RATE: 18 BRPM | HEART RATE: 70 BPM | DIASTOLIC BLOOD PRESSURE: 80 MMHG | BODY MASS INDEX: 30.02 KG/M2 | WEIGHT: 186 LBS | OXYGEN SATURATION: 94 %

## 2019-06-19 PROCEDURE — 99215 OFFICE O/P EST HI 40 MIN: CPT

## 2019-06-19 PROCEDURE — 93000 ELECTROCARDIOGRAM COMPLETE: CPT

## 2019-06-19 NOTE — PHYSICAL EXAM
[General Appearance - Well Developed] : well developed [Normal Appearance] : normal appearance [Well Groomed] : well groomed [General Appearance - Well Nourished] : well nourished [No Deformities] : no deformities [General Appearance - In No Acute Distress] : no acute distress [Eyelids - No Xanthelasma] : the eyelids demonstrated no xanthelasmas [Normal Conjunctiva] : the conjunctiva exhibited no abnormalities [Normal Oral Mucosa] : normal oral mucosa [No Oral Pallor] : no oral pallor [No Oral Cyanosis] : no oral cyanosis [Normal Jugular Venous A Waves Present] : normal jugular venous A waves present [No Jugular Venous Coronel A Waves] : no jugular venous coronel A waves [Normal Jugular Venous V Waves Present] : normal jugular venous V waves present [Exaggerated Use Of Accessory Muscles For Inspiration] : no accessory muscle use [Respiration, Rhythm And Depth] : normal respiratory rhythm and effort [Auscultation Breath Sounds / Voice Sounds] : lungs were clear to auscultation bilaterally [Heart Rate And Rhythm] : heart rate and rhythm were normal [Heart Sounds] : normal S1 and S2 [Abdomen Soft] : soft [Murmurs] : no murmurs present [Abdomen Tenderness] : non-tender [Abdomen Mass (___ Cm)] : no abdominal mass palpated [Gait - Sufficient For Exercise Testing] : the gait was sufficient for exercise testing [Abnormal Walk] : normal gait [Cyanosis, Localized] : no localized cyanosis [Nail Clubbing] : no clubbing of the fingernails [Petechial Hemorrhages (___cm)] : no petechial hemorrhages [Skin Color & Pigmentation] : normal skin color and pigmentation [] : no rash [Skin Lesions] : no skin lesions [No Venous Stasis] : no venous stasis [No Skin Ulcers] : no skin ulcer [No Xanthoma] : no  xanthoma was observed [Affect] : the affect was normal [Oriented To Time, Place, And Person] : oriented to person, place, and time [Mood] : the mood was normal [No Anxiety] : not feeling anxious

## 2019-06-19 NOTE — DISCUSSION/SUMMARY
[Risks] : risks [Patient] : the patient [Benefits] : benefits [Alternatives] : alternatives [___ Year(s)] : [unfilled] year(s) [FreeTextEntry1] : This is a 53 year old male with hypertension and dyslipidemia and Diabetes Mellitus here for coronary artery disease evaluation\par 1) coronary artery disease evaluation: Risk factors for coronary artery disease .  normal cardiac cTA,. d.c aspirin. \par 2) dizziness: carotid  ultrasound. 2D echo,. : \par 3) mild carotid atherosclerosis. no stenosis: \par 4) dyslipidemia : ct statins. LDL : reviewed. pravastatin 10 mg bedtime. \par 5) hypertension: controlled ct losartan-HCTZ to 100/25 mg daily. restrict salt intake.  [With Me] : with me

## 2019-06-19 NOTE — CARDIOLOGY SUMMARY
[LVEF ___%] : LVEF [unfilled]% [___] : [unfilled] [Normal] : normal LA size [None] : no mitral regurgitation [de-identified] : Carotid US: 3/2019: Mild atherosclerosis. no stensis.

## 2019-07-12 ENCOUNTER — MEDICATION RENEWAL (OUTPATIENT)
Age: 54
End: 2019-07-12

## 2019-07-12 RX ORDER — LOSARTAN POTASSIUM AND HYDROCHLOROTHIAZIDE 25; 100 MG/1; MG/1
100-25 TABLET ORAL
Qty: 30 | Refills: 5 | Status: DISCONTINUED | COMMUNITY
Start: 2019-03-19 | End: 2019-07-12

## 2019-08-31 ENCOUNTER — APPOINTMENT (OUTPATIENT)
Dept: FAMILY MEDICINE | Facility: CLINIC | Age: 54
End: 2019-08-31
Payer: COMMERCIAL

## 2019-08-31 VITALS
OXYGEN SATURATION: 97 % | WEIGHT: 188 LBS | SYSTOLIC BLOOD PRESSURE: 121 MMHG | HEART RATE: 69 BPM | HEIGHT: 66 IN | DIASTOLIC BLOOD PRESSURE: 81 MMHG | TEMPERATURE: 98.2 F | BODY MASS INDEX: 30.22 KG/M2

## 2019-08-31 PROCEDURE — 83036 HEMOGLOBIN GLYCOSYLATED A1C: CPT | Mod: QW

## 2019-08-31 PROCEDURE — 99213 OFFICE O/P EST LOW 20 MIN: CPT | Mod: 25

## 2019-08-31 NOTE — COUNSELING
[AUDIT-C Screening administered and reviewed] : AUDIT-C Screening administered and reviewed [Potential consequences of obesity discussed] : Potential consequences of obesity discussed [Benefits of weight loss discussed] : Benefits of weight loss discussed [None] : None [____ min/wk Activity] : [unfilled] min/wk activity [Good understanding] : Patient has a good understanding of lifestyle changes and steps needed to achieve self management goal

## 2019-08-31 NOTE — ASSESSMENT
[FreeTextEntry1] : This is a 52 y/o male with PMHx including HTN, HLD, DM2 uncontrolled, IRBBB, Vitamin D insufficiency, presenting to the office for Diabetes follow up.\par \par CVS: h/o HTN, HLD, IRBBB\par -Blood pressure well controlled.\par -Losartan increased to 100/25 by Cardiology Dr Zapata.\par -Continue Pravastatin 10 mg at bedtime\par -Pt underwent CT Angio on 4/5/19, found with nml coronaries, Calcium score 0.25\par -Echocardiogram performed March 2019, EF 57%, no significant valvular abnormality, Carotid Doppler with no significant stenosis.\par -Diet and exercise discussed.\par \par Endo: h/o DM2 uncontrolled\par -HgbA1c 8.8, today 9.2\par -Reports FBS 10s-140s\par -Pt taking Pioglitazone/Metformin bid, Jardiance 25 mg daily.\par -Diet and exercise discussed\par -Blood sugar monitoring at least 3 x a week.\par -If no improvement on his A1c in 3 months, will start Basal insulin.\par \par : h/o erectile dysfunction\par -Renewed Sildenafil 20 mg\par -Testosterone level and Prolactin levels wnl.\par \par HCM:\par -Received Tetanus Vaccine 11/17\par -Received Pneumonia Vaccine 6/17 (pneumovax)\par -Colonoscopy 9/2016, recommend repeat 2026\par -Ophthalmology for diabetic eye exam, appointment in May, will request record..\par

## 2019-08-31 NOTE — HISTORY OF PRESENT ILLNESS
[FreeTextEntry1] : Diabetes follow up [de-identified] : This is a 52 y/o male with PMHx including HTN, HLD, DM2 uncontrolled, IRBBB, Vitamin D insufficiency, presenting to the office for diabetes follow up, no acute complains.

## 2019-08-31 NOTE — HEALTH RISK ASSESSMENT
[No] : In the past 12 months have you used drugs other than those required for medical reasons? No [No falls in past year] : Patient reported no falls in the past year [0] : 2) Feeling down, depressed, or hopeless: Not at all (0) [] : No [de-identified] : none [[id number]] : 0 [Audit-CScore] : 0

## 2019-09-03 LAB — HBA1C MFR BLD HPLC: 9.2

## 2019-10-02 ENCOUNTER — MEDICATION RENEWAL (OUTPATIENT)
Age: 54
End: 2019-10-02

## 2019-12-21 ENCOUNTER — APPOINTMENT (OUTPATIENT)
Dept: FAMILY MEDICINE | Facility: CLINIC | Age: 54
End: 2019-12-21
Payer: COMMERCIAL

## 2019-12-21 VITALS
BODY MASS INDEX: 28.93 KG/M2 | SYSTOLIC BLOOD PRESSURE: 115 MMHG | DIASTOLIC BLOOD PRESSURE: 79 MMHG | OXYGEN SATURATION: 98 % | HEIGHT: 66 IN | WEIGHT: 180 LBS | HEART RATE: 70 BPM | TEMPERATURE: 97.7 F

## 2019-12-21 DIAGNOSIS — Z87.898 PERSONAL HISTORY OF OTHER SPECIFIED CONDITIONS: ICD-10-CM

## 2019-12-21 PROCEDURE — 99213 OFFICE O/P EST LOW 20 MIN: CPT | Mod: 25

## 2019-12-21 PROCEDURE — 83036 HEMOGLOBIN GLYCOSYLATED A1C: CPT | Mod: QW

## 2019-12-21 NOTE — HEALTH RISK ASSESSMENT
[No] : In the past 12 months have you used drugs other than those required for medical reasons? No [No falls in past year] : Patient reported no falls in the past year [0] : 2) Feeling down, depressed, or hopeless: Not at all (0) [] : No [Audit-CScore] : 0 [de-identified] : none [[id number]] : 0

## 2019-12-21 NOTE — ASSESSMENT
[FreeTextEntry1] : This is a 54 y/o male with PMHx including HTN, HLD, DM2 uncontrolled, IRBBB, Vitamin D insufficiency, presenting to the office for Diabetes follow up.\par \par CVS: h/o HTN, HLD, IRBBB\par -Blood pressure well controlled.\par -Continues taking Losartan 100/25.\par -Follows up with Dr Bui.\par -Continue Pravastatin 10 mg at bedtime\par -Pt underwent CT Angio on 4/5/19, found with nml coronaries, Calcium score 0.25\par -Echocardiogram performed March 2019, EF 57%, no significant valvular abnormality, Carotid Doppler with no significant stenosis.\par -Diet and exercise discussed.\par \par Endo: h/o DM2 uncontrolled\par -HgbA1c  9.2, today 8.6\par -Pt taking Pioglitazone/Metformin bid, Jardiance 25 mg daily.\par -Diet and exercise discussed\par -Blood sugar monitoring at least 3 x a week.\par \par : h/o erectile dysfunction\par -Renewed Sildenafil 20 mg\par -Testosterone level and Prolactin levels wnl.\par \par HCM:\par -Declines flu vaccine\par -Received Tetanus Vaccine 11/17\par -Received Pneumonia Vaccine 6/17 (pneumovax)\par -Colonoscopy 9/2016, recommend repeat 2026\par -Ophthalmology for diabetic eye exam, appointment in May, [facility], will request record.\par

## 2019-12-21 NOTE — COUNSELING
[AUDIT-C Screening administered and reviewed] : AUDIT-C Screening administered and reviewed [Potential consequences of obesity discussed] : Potential consequences of obesity discussed [Benefits of weight loss discussed] : Benefits of weight loss discussed [____ min/wk Activity] : [unfilled] min/wk activity [None] : None [Good understanding] : Patient has a good understanding of lifestyle changes and steps needed to achieve self management goal

## 2019-12-21 NOTE — HISTORY OF PRESENT ILLNESS
[FreeTextEntry1] : Diabetes follow up [de-identified] : This is a 54 y/o male with PMHx including HTN, HLD, DM2 uncontrolled, IRBBB, Vitamin D insufficiency, presenting to the office for diabetes follow up, no acute complains.

## 2019-12-22 LAB — HBA1C MFR BLD HPLC: 8.6

## 2020-03-28 ENCOUNTER — APPOINTMENT (OUTPATIENT)
Dept: FAMILY MEDICINE | Facility: CLINIC | Age: 55
End: 2020-03-28

## 2020-04-03 ENCOUNTER — APPOINTMENT (OUTPATIENT)
Dept: FAMILY MEDICINE | Facility: CLINIC | Age: 55
End: 2020-04-03
Payer: COMMERCIAL

## 2020-04-03 PROCEDURE — G2012 BRIEF CHECK IN BY MD/QHP: CPT

## 2020-04-09 ENCOUNTER — APPOINTMENT (OUTPATIENT)
Dept: FAMILY MEDICINE | Facility: CLINIC | Age: 55
End: 2020-04-09

## 2020-04-09 NOTE — ASSESSMENT
[FreeTextEntry1] : Pt evaluated by telemedicine after Viral Illness\par -Advise to remain under quarantine until 4/13/20.\par -Will reevaluate pt on 4/13/20.\par -Call back prn.

## 2020-04-09 NOTE — HISTORY OF PRESENT ILLNESS
[Home] : at home, [unfilled] , at the time of the visit. [Medical Office: ([facility])___] : at ~his/her~ medical office located [place] [Patient] : the patient [FreeTextEntry8] : Pt evaluated by phone on 4/3/20.\par Not feeling well since 3/28th. Initially had sore thorat that got worse next day.\par Pt is not going to work since monday 3/30th. Pt states has cough with clear mucus, body aches. 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 No fever, cough. Eating well.\par Will f/up on 4/13/20 to evaluate return to work.\par Pt understand and agrees with plan. \par \par

## 2020-04-13 ENCOUNTER — APPOINTMENT (OUTPATIENT)
Dept: FAMILY MEDICINE | Facility: CLINIC | Age: 55
End: 2020-04-13
Payer: COMMERCIAL

## 2020-04-13 PROCEDURE — 99441: CPT

## 2020-06-30 ENCOUNTER — RX RENEWAL (OUTPATIENT)
Age: 55
End: 2020-06-30

## 2020-07-17 ENCOUNTER — APPOINTMENT (OUTPATIENT)
Dept: CARDIOLOGY | Facility: CLINIC | Age: 55
End: 2020-07-17

## 2020-07-28 ENCOUNTER — APPOINTMENT (OUTPATIENT)
Dept: FAMILY MEDICINE | Facility: CLINIC | Age: 55
End: 2020-07-28

## 2020-07-31 ENCOUNTER — APPOINTMENT (OUTPATIENT)
Dept: FAMILY MEDICINE | Facility: CLINIC | Age: 55
End: 2020-07-31

## 2020-08-17 ENCOUNTER — RX RENEWAL (OUTPATIENT)
Age: 55
End: 2020-08-17

## 2020-11-29 ENCOUNTER — RESULT CHARGE (OUTPATIENT)
Age: 55
End: 2020-11-29

## 2020-11-30 ENCOUNTER — APPOINTMENT (OUTPATIENT)
Dept: FAMILY MEDICINE | Facility: CLINIC | Age: 55
End: 2020-11-30
Payer: COMMERCIAL

## 2020-11-30 VITALS
HEIGHT: 66 IN | BODY MASS INDEX: 28.93 KG/M2 | SYSTOLIC BLOOD PRESSURE: 130 MMHG | WEIGHT: 180 LBS | RESPIRATION RATE: 14 BRPM | TEMPERATURE: 98 F | DIASTOLIC BLOOD PRESSURE: 80 MMHG | HEART RATE: 69 BPM | OXYGEN SATURATION: 98 %

## 2020-11-30 DIAGNOSIS — Z20.828 CONTACT WITH AND (SUSPECTED) EXPOSURE TO OTHER VIRAL COMMUNICABLE DISEASES: ICD-10-CM

## 2020-11-30 PROCEDURE — 99214 OFFICE O/P EST MOD 30 MIN: CPT | Mod: 25

## 2020-11-30 PROCEDURE — 83036 HEMOGLOBIN GLYCOSYLATED A1C: CPT | Mod: QW

## 2020-11-30 PROCEDURE — 99072 ADDL SUPL MATRL&STAF TM PHE: CPT

## 2020-11-30 RX ORDER — BLOOD-GLUCOSE METER
W/DEVICE EACH MISCELLANEOUS
Qty: 1 | Refills: 0 | Status: COMPLETED | COMMUNITY
Start: 2019-02-02 | End: 2020-11-30

## 2020-11-30 RX ORDER — LANCING DEVICE/LANCETS
KIT MISCELLANEOUS
Qty: 1 | Refills: 0 | Status: COMPLETED | COMMUNITY
Start: 2019-02-02 | End: 2020-11-30

## 2020-11-30 RX ORDER — BLOOD SUGAR DIAGNOSTIC
STRIP MISCELLANEOUS DAILY
Qty: 1 | Refills: 1 | Status: COMPLETED | COMMUNITY
Start: 2019-02-02 | End: 2020-11-30

## 2020-11-30 RX ORDER — LANCETS
EACH MISCELLANEOUS
Qty: 1 | Refills: 0 | Status: COMPLETED | COMMUNITY
Start: 2019-02-02 | End: 2020-11-30

## 2020-11-30 NOTE — ASSESSMENT
[FreeTextEntry1] : This is a 54 y/o male with PMHx including HTN, HLD, DM2 uncontrolled, IRBBB, Civid-19 Illness by symptoms, Vitamin D insufficiency, presenting to the office for Diabetes follow up.\par \par Endo: h/o DM2 uncontrolled\par -HgbA1c  9.2, --> 8.6, today 7.5\par -Pt taking Pioglitazone/Metformin bid, will split secondary to insurance copay, Jardiance 25 mg switched to Januvia 100 mg daily also secondary to co-payments..\par -Diet and exercise discussed\par -Blood sugar monitoring at least 3 x a week.\par \par CVS: h/o HTN, HLD, IRBBB\par -Blood pressure well controlled.\par -Continues taking Losartan /25.\par -Follows up with Dr Bui.\par -Continue Pravastatin 10 mg at bedtime\par -Pt underwent CT Angio on 4/5/19, found with nml coronaries, Calcium score 0.25\par -Echocardiogram performed March 2019, EF 57%, no significant valvular abnormality, Carotid Doppler with no significant stenosis.\par -Diet and exercise discussed.\par \par : h/o erectile dysfunction\par -Renewed Sildenafil 20 mg\par -Testosterone level and Prolactin levels wnl.\par \par HCM:\par -Flu vaccine obtained 10/20 at local pharmacy\par -Received Tetanus Vaccine 11/17\par -Received Pneumonia Vaccine 6/17 (pneumovax)\par -Colonoscopy 9/2016, recommend repeat 2026\par -Ophthalmology for diabetic eye exam, referral given.\par -RTO in 3 months for follow up/CPE.

## 2020-11-30 NOTE — HEALTH RISK ASSESSMENT
[No] : In the past 12 months have you used drugs other than those required for medical reasons? No [No falls in past year] : Patient reported no falls in the past year [0] : 2) Feeling down, depressed, or hopeless: Not at all (0) [] : No [Audit-CScore] : 0 [de-identified] : none [[id number]] : 0

## 2020-11-30 NOTE — HISTORY OF PRESENT ILLNESS
[FreeTextEntry1] : diabetes follow up. [de-identified] : This is a 56 y/o male with PMHx including HTN, HLD, DM2 uncontrolled, IRBBB, Vitamin D insufficiency, COVID-19 illness by symptoms (3/28 - 4/20 on quarantine) presenting to the office for diabetes follow up, no acute complains.

## 2020-12-01 RX ORDER — SITAGLIPTIN 100 MG/1
100 TABLET, FILM COATED ORAL DAILY
Qty: 90 | Refills: 1 | Status: DISCONTINUED | COMMUNITY
Start: 2020-11-30 | End: 2020-12-01

## 2020-12-07 ENCOUNTER — RX CHANGE (OUTPATIENT)
Age: 55
End: 2020-12-07

## 2020-12-07 RX ORDER — BLOOD-GLUCOSE METER
EACH MISCELLANEOUS
Qty: 1 | Refills: 0 | Status: ACTIVE | COMMUNITY
Start: 2020-12-07 | End: 1900-01-01

## 2020-12-07 RX ORDER — BLOOD-GLUCOSE METER
KIT MISCELLANEOUS
Qty: 1 | Refills: 0 | Status: ACTIVE | COMMUNITY
Start: 2020-12-07 | End: 1900-01-01

## 2020-12-10 ENCOUNTER — RX CHANGE (OUTPATIENT)
Age: 55
End: 2020-12-10

## 2020-12-22 ENCOUNTER — RX CHANGE (OUTPATIENT)
Age: 55
End: 2020-12-22

## 2021-01-19 ENCOUNTER — RX RENEWAL (OUTPATIENT)
Age: 56
End: 2021-01-19

## 2021-01-25 ENCOUNTER — RX CHANGE (OUTPATIENT)
Age: 56
End: 2021-01-25

## 2021-02-22 ENCOUNTER — APPOINTMENT (OUTPATIENT)
Dept: FAMILY MEDICINE | Facility: CLINIC | Age: 56
End: 2021-02-22
Payer: COMMERCIAL

## 2021-02-22 VITALS
HEART RATE: 86 BPM | BODY MASS INDEX: 29.73 KG/M2 | TEMPERATURE: 98.1 F | RESPIRATION RATE: 12 BRPM | SYSTOLIC BLOOD PRESSURE: 124 MMHG | DIASTOLIC BLOOD PRESSURE: 78 MMHG | HEIGHT: 66 IN | WEIGHT: 185 LBS | OXYGEN SATURATION: 98 %

## 2021-02-22 DIAGNOSIS — Z86.19 PERSONAL HISTORY OF OTHER INFECTIOUS AND PARASITIC DISEASES: ICD-10-CM

## 2021-02-22 DIAGNOSIS — I20.8 OTHER FORMS OF ANGINA PECTORIS: ICD-10-CM

## 2021-02-22 DIAGNOSIS — R53.81 OTHER MALAISE: ICD-10-CM

## 2021-02-22 DIAGNOSIS — R53.83 OTHER MALAISE: ICD-10-CM

## 2021-02-22 PROCEDURE — 99072 ADDL SUPL MATRL&STAF TM PHE: CPT

## 2021-02-22 PROCEDURE — 99213 OFFICE O/P EST LOW 20 MIN: CPT | Mod: 25

## 2021-02-22 PROCEDURE — 83036 HEMOGLOBIN GLYCOSYLATED A1C: CPT | Mod: QW

## 2021-02-22 NOTE — HISTORY OF PRESENT ILLNESS
[de-identified] : This is a 54 y/o male with PMHx including HTN, HLD, DM2 uncontrolled, IRBBB, Vitamin D insufficiency, COVID-19 illness by symptoms (3/28 - 4/20 on quarantine) presenting to the office for diabetes follow up.

## 2021-02-22 NOTE — HEALTH RISK ASSESSMENT
[No] : In the past 12 months have you used drugs other than those required for medical reasons? No [No falls in past year] : Patient reported no falls in the past year [0] : 2) Feeling down, depressed, or hopeless: Not at all (0) [] : No [Audit-CScore] : 0 [de-identified] : none [[id number]] : 0

## 2021-02-22 NOTE — ASSESSMENT
[FreeTextEntry1] : This is a 52 y/o male with PMHx including HTN, HLD, DM2 uncontrolled, IRBBB, Civid-19 Illness by symptoms, Vitamin D insufficiency, presenting to the office for Diabetes follow up.\par \par Endo: h/o DM2 uncontrolled\par -HgbA1c  9.2, --> 8.6 --> 7.5 --> 7.4\par -Pt taking Pioglitazone 15 mg bid, Metformin 1000 mg bid, Januvia 100 mg daily..\par -Diet and exercise discussed\par -Blood sugar monitoring at least 3 x a week.\par \par CVS: h/o HTN, HLD, IRBBB\par -Blood pressure well controlled.\par -Continues taking Losartan /25.\par -Follows up with Dr Bui.\par -Continue Pravastatin 10 mg at bedtime\par -Pt underwent CT Angio on 4/5/19, found with nml coronaries, Calcium score 0.25\par -Echocardiogram performed March 2019, EF 57%, no significant valvular abnormality, Carotid Doppler with no significant stenosis.\par -Diet and exercise discussed.\par \par : h/o erectile dysfunction\par -Renewed Sildenafil 20 mg\par -Testosterone level and Prolactin levels wnl.\par \par HCM:\par -Flu vaccine obtained 10/20 at local pharmacy\par -Received Tetanus Vaccine 11/17\par -Received Pneumonia Vaccine 6/17 (pneumovax)\par -Colonoscopy 9/2016, recommend repeat 2026\par -Ophthalmology for diabetic eye exam, referral given.\par -RTO in 3 months for follow up.

## 2021-03-12 ENCOUNTER — RX RENEWAL (OUTPATIENT)
Age: 56
End: 2021-03-12

## 2021-05-17 ENCOUNTER — APPOINTMENT (OUTPATIENT)
Dept: FAMILY MEDICINE | Facility: CLINIC | Age: 56
End: 2021-05-17

## 2021-08-30 ENCOUNTER — RX RENEWAL (OUTPATIENT)
Age: 56
End: 2021-08-30

## 2021-10-11 DIAGNOSIS — B00.9 HERPESVIRAL INFECTION, UNSPECIFIED: ICD-10-CM

## 2021-10-19 ENCOUNTER — APPOINTMENT (OUTPATIENT)
Dept: FAMILY MEDICINE | Facility: CLINIC | Age: 56
End: 2021-10-19

## 2021-12-13 ENCOUNTER — APPOINTMENT (OUTPATIENT)
Dept: FAMILY MEDICINE | Facility: CLINIC | Age: 56
End: 2021-12-13
Payer: COMMERCIAL

## 2021-12-13 VITALS
HEART RATE: 88 BPM | TEMPERATURE: 97.9 F | SYSTOLIC BLOOD PRESSURE: 132 MMHG | OXYGEN SATURATION: 98 % | WEIGHT: 189 LBS | HEIGHT: 66 IN | DIASTOLIC BLOOD PRESSURE: 70 MMHG | BODY MASS INDEX: 30.37 KG/M2 | RESPIRATION RATE: 16 BRPM

## 2021-12-13 DIAGNOSIS — Z23 ENCOUNTER FOR IMMUNIZATION: ICD-10-CM

## 2021-12-13 LAB — HBA1C MFR BLD HPLC: 8.6

## 2021-12-13 PROCEDURE — 99214 OFFICE O/P EST MOD 30 MIN: CPT | Mod: 25

## 2021-12-13 PROCEDURE — 0013A: CPT

## 2021-12-13 PROCEDURE — 83036 HEMOGLOBIN GLYCOSYLATED A1C: CPT | Mod: QW

## 2021-12-13 NOTE — ASSESSMENT
[FreeTextEntry1] : This is a 54 y/o male with PMHx including HTN, HLD, DM2 uncontrolled, IRBBB, Civid-19 Illness by symptoms, Vitamin D insufficiency, presenting to the office for Diabetes follow up.\par \par Endo: h/o DM2 uncontrolled\par -HgbA1c  9.2, --> 8.6 --> 7.5 --> 7.4-->8.6\par -Obviously pt not forthcoming with blood sugars at home, has not had medications over 3 months\par -Pioglitazone 15 mg bid, Metformin 1000 mg bid, Januvia 100 mg daily all refilled today.\par -Diet and exercise discussed\par -Blood sugar monitoring at least 3 x a week.\par \par CVS: h/o HTN, HLD, IRBBB\par -Blood pressure well controlled.\par -Continues taking Losartan /25.\par -Follows up with Dr Bui.\par -Continue Pravastatin 10 mg at bedtime\par -Pt underwent CT Angio on 4/5/19, found with nml coronaries, Calcium score 0.25\par -Echocardiogram performed March 2019, EF 57%, no significant valvular abnormality, Carotid Doppler with no significant stenosis.\par -Diet and exercise discussed.\par \par : h/o erectile dysfunction\par -Renewed Sildenafil 20 mg\par -Testosterone level and Prolactin levels wnl.\par \par HCM:\par -Blood work in house, fasting for 7 hrs today\par -Flu vaccine obtained 10/20 at local pharmacy\par -Received Tetanus Vaccine 11/17\par -Received Pneumonia Vaccine 6/17 (pneumovax)\par -Colonoscopy 9/2016, recommend repeat 2026\par -Ophthalmology for diabetic eye exam, referral given.\par -RTO in 3 months for follow up.\par -Covid vaccine completed, booster vaccine today MODERNA

## 2021-12-13 NOTE — HISTORY OF PRESENT ILLNESS
[FreeTextEntry1] : diabetes check [de-identified] : This is a 56 y/o male with PMHx including HTN, HLD, DM2 uncontrolled, IRBBB, Vitamin D insufficiency, COVID-19 illness by symptoms (3/28 - 4/20 on quarantine) presenting to the office for diabetes follow up. Pt has not been seen since Feb., states that is blood sugar was 129 this morning.

## 2021-12-13 NOTE — HEALTH RISK ASSESSMENT
[No] : In the past 12 months have you used drugs other than those required for medical reasons? No [No falls in past year] : Patient reported no falls in the past year [0] : 2) Feeling down, depressed, or hopeless: Not at all (0) [1 or 2 (0 pts)] : 1 or 2 (0 points) [Never (0 pts)] : Never (0 points) [PHQ-2 Negative - No further assessment needed] : PHQ-2 Negative - No further assessment needed [Patient/Caregiver not ready to engage] : , patient/caregiver not ready to engage [Reviewed updated] : Reviewed, updated [Designated Healthcare Proxy] : Designated healthcare proxy [Audit-CScore] : 0 [de-identified] : none [[id number]] : 0 [AdvancecareDate] : 12/21

## 2021-12-29 LAB
ALBUMIN SERPL ELPH-MCNC: 4.8 G/DL
ALP BLD-CCNC: 112 U/L
ALT SERPL-CCNC: 50 U/L
ANION GAP SERPL CALC-SCNC: 12 MMOL/L
AST SERPL-CCNC: 28 U/L
BILIRUB SERPL-MCNC: 0.2 MG/DL
BUN SERPL-MCNC: 15 MG/DL
CALCIUM SERPL-MCNC: 10 MG/DL
CHLORIDE SERPL-SCNC: 99 MMOL/L
CO2 SERPL-SCNC: 26 MMOL/L
CREAT SERPL-MCNC: 0.86 MG/DL
GLUCOSE SERPL-MCNC: 209 MG/DL
POTASSIUM SERPL-SCNC: 3.9 MMOL/L
PROT SERPL-MCNC: 7.7 G/DL
SODIUM SERPL-SCNC: 137 MMOL/L

## 2022-01-25 ENCOUNTER — RX RENEWAL (OUTPATIENT)
Age: 57
End: 2022-01-25

## 2022-02-03 ENCOUNTER — APPOINTMENT (OUTPATIENT)
Dept: FAMILY MEDICINE | Facility: CLINIC | Age: 57
End: 2022-02-03
Payer: COMMERCIAL

## 2022-02-03 VITALS
BODY MASS INDEX: 29.57 KG/M2 | SYSTOLIC BLOOD PRESSURE: 136 MMHG | TEMPERATURE: 98 F | DIASTOLIC BLOOD PRESSURE: 84 MMHG | WEIGHT: 184 LBS | HEIGHT: 66 IN | RESPIRATION RATE: 16 BRPM | HEART RATE: 86 BPM | OXYGEN SATURATION: 99 %

## 2022-02-03 PROCEDURE — G0008: CPT

## 2022-02-03 PROCEDURE — 90686 IIV4 VACC NO PRSV 0.5 ML IM: CPT

## 2022-02-04 NOTE — PLAN
[FreeTextEntry1] : Patient given flu vaccine in left deltoid, he tolerated well.  Denies any adverse reaction to injections.  Education sheet given in Croatian.  ARIELA, RN

## 2022-02-07 ENCOUNTER — RX RENEWAL (OUTPATIENT)
Age: 57
End: 2022-02-07

## 2022-02-18 ENCOUNTER — RX RENEWAL (OUTPATIENT)
Age: 57
End: 2022-02-18

## 2022-04-13 ENCOUNTER — APPOINTMENT (OUTPATIENT)
Dept: FAMILY MEDICINE | Facility: CLINIC | Age: 57
End: 2022-04-13
Payer: COMMERCIAL

## 2022-04-13 VITALS
RESPIRATION RATE: 16 BRPM | OXYGEN SATURATION: 98 % | TEMPERATURE: 97.9 F | HEART RATE: 88 BPM | BODY MASS INDEX: 29.09 KG/M2 | HEIGHT: 66 IN | WEIGHT: 181 LBS | DIASTOLIC BLOOD PRESSURE: 78 MMHG | SYSTOLIC BLOOD PRESSURE: 118 MMHG

## 2022-04-13 DIAGNOSIS — Z12.11 ENCOUNTER FOR SCREENING FOR MALIGNANT NEOPLASM OF COLON: ICD-10-CM

## 2022-04-13 PROCEDURE — 99396 PREV VISIT EST AGE 40-64: CPT | Mod: 25

## 2022-04-13 PROCEDURE — 83036 HEMOGLOBIN GLYCOSYLATED A1C: CPT | Mod: QW

## 2022-04-13 RX ORDER — DOCOSANOL 100 MG/G
10 CREAM TOPICAL DAILY
Qty: 1 | Refills: 0 | Status: COMPLETED | COMMUNITY
Start: 2021-10-11 | End: 2022-04-13

## 2022-04-13 NOTE — HISTORY OF PRESENT ILLNESS
[FreeTextEntry1] : Physical exam [de-identified] : This is a 57 y/o male with PMHx including HTN, HLD, DM2 uncontrolled, IRBBB, Vitamin D insufficiency, COVID-19 illness by symptoms (3/28 - 4/20 on quarantine) presenting to the office for diabetes follow up and CPE. Pt offers no acute complains

## 2022-04-13 NOTE — COUNSELING
[AUDIT-C Screening administered and reviewed] : AUDIT-C Screening administered and reviewed [Potential consequences of obesity discussed] : Potential consequences of obesity discussed [Benefits of weight loss discussed] : Benefits of weight loss discussed [____ min/wk Activity] : [unfilled] min/wk activity [None] : None [Good understanding] : Patient has a good understanding of lifestyle changes and steps needed to achieve self management goal [Fall prevention counseling provided] : Fall prevention counseling provided [Adequate lighting] : Adequate lighting [No throw rugs] : No throw rugs [Use proper foot wear] : Use proper foot wear [Use recommended devices] : Use recommended devices [Behavioral health counseling provided] : Behavioral health counseling provided [Sleep ___ hours/day] : Sleep [unfilled] hours/day [Engage in a relaxing activity] : Engage in a relaxing activity [Plan in advance] : Plan in advance

## 2022-04-13 NOTE — ASSESSMENT
[FreeTextEntry1] : This is a 52 y/o male with PMHx including HTN, HLD, DM2 uncontrolled, IRBBB, Covid-19 Illness by symptoms, Vitamin D insufficiency, presenting to the office for Diabetes follow up and CPE.\par \par Endo: h/o DM2 uncontrolled\par -HgbA1c  9.2, --> 8.6 --> 7.5 --> 7.4 --> 8.6 --> 7.6\par -Improved from previous reading.\par -Continue Pioglitazone 15 mg bid, Metformin 1000 mg bid, Januvia 100 mg daily, glipizide 5 mg, all refilled today.\par -Diet and exercise discussed\par -Blood sugar monitoring at least 3 x a week.\par \par CVS: h/o HTN, HLD, IRBBB\par -Blood pressure  optimal\par -Continue taking Losartan /25, refilled\par -Follows up with Dr Bui.\par -Continue Pravastatin 10 mg at bedtime\par -Pt underwent CT Angio on 4/5/19, found with nml coronaries, Calcium score 0.25\par -Echocardiogram performed March 2019, EF 57%, no significant valvular abnormality, Carotid Doppler with no significant stenosis.\par -Diet and exercise discussed.\par \par : h/o erectile dysfunction\par -Renewed Sildenafil 20 mg\par -Testosterone level and Prolactin levels wnl.\par \par HCM:\par -Blood work as outpt\par -Flu vaccine 02/22\par -Received Tetanus Vaccine 11/17\par -Received Pneumonia Vaccine 6/17 (pneumovax)\par -Colonoscopy 9/2016, recommend repeat 2026\par -Ophthalmology for diabetic eye exam, 3/22, will request records\par -RTO in 3 months for follow up.\par -Covid vaccine completed, booster vaccine today MODERNA

## 2022-04-13 NOTE — PHYSICAL EXAM
[No Acute Distress] : no acute distress [Well Nourished] : well nourished [Well Developed] : well developed [Well-Appearing] : well-appearing [Normal Sclera/Conjunctiva] : normal sclera/conjunctiva [PERRL] : pupils equal round and reactive to light [EOMI] : extraocular movements intact [Normal Outer Ear/Nose] : the outer ears and nose were normal in appearance [Normal Oropharynx] : the oropharynx was normal [No JVD] : no jugular venous distention [No Lymphadenopathy] : no lymphadenopathy [Supple] : supple [Thyroid Normal, No Nodules] : the thyroid was normal and there were no nodules present [No Respiratory Distress] : no respiratory distress  [No Accessory Muscle Use] : no accessory muscle use [Clear to Auscultation] : lungs were clear to auscultation bilaterally [Normal Rate] : normal rate  [Regular Rhythm] : with a regular rhythm [Normal S1, S2] : normal S1 and S2 [No Murmur] : no murmur heard [No Carotid Bruits] : no carotid bruits [No Abdominal Bruit] : a ~M bruit was not heard ~T in the abdomen [No Varicosities] : no varicosities [Pedal Pulses Present] : the pedal pulses are present [No Edema] : there was no peripheral edema [No Palpable Aorta] : no palpable aorta [No Extremity Clubbing/Cyanosis] : no extremity clubbing/cyanosis [Soft] : abdomen soft [Non Tender] : non-tender [Non-distended] : non-distended [No Masses] : no abdominal mass palpated [No HSM] : no HSM [Normal Bowel Sounds] : normal bowel sounds [Normal Posterior Cervical Nodes] : no posterior cervical lymphadenopathy [Normal Anterior Cervical Nodes] : no anterior cervical lymphadenopathy [No CVA Tenderness] : no CVA  tenderness [No Spinal Tenderness] : no spinal tenderness [No Joint Swelling] : no joint swelling [Grossly Normal Strength/Tone] : grossly normal strength/tone [No Rash] : no rash [Coordination Grossly Intact] : coordination grossly intact [No Focal Deficits] : no focal deficits [Normal Gait] : normal gait [Deep Tendon Reflexes (DTR)] : deep tendon reflexes were 2+ and symmetric [Normal Affect] : the affect was normal [Normal Insight/Judgement] : insight and judgment were intact [Right Foot Was Examined] : Right foot ~C was examined [Left Foot Was Examined] : left foot ~C was examined [None] : no ulcers in either foot were found [] : both feet [TWNoteComboBox3] : +2 [TWNoteComboBox4] : +2

## 2022-04-13 NOTE — HEALTH RISK ASSESSMENT
[Good] : ~his/her~  mood as  good [1 or 2 (0 pts)] : 1 or 2 (0 points) [Never (0 pts)] : Never (0 points) [No] : In the past 12 months have you used drugs other than those required for medical reasons? No [No falls in past year] : Patient reported no falls in the past year [0] : 2) Feeling down, depressed, or hopeless: Not at all (0) [PHQ-2 Negative - No further assessment needed] : PHQ-2 Negative - No further assessment needed [None] : None [With Family] : lives with family [# of Members in Household ___] :  household currently consist of [unfilled] member(s) [Employed] : employed [High School] : high school [] :  [# Of Children ___] : has [unfilled] children [Sexually Active] : sexually active [Feels Safe at Home] : Feels safe at home [Fully functional (bathing, dressing, toileting, transferring, walking, feeding)] : Fully functional (bathing, dressing, toileting, transferring, walking, feeding) [Fully functional (using the telephone, shopping, preparing meals, housekeeping, doing laundry, using] : Fully functional and needs no help or supervision to perform IADLs (using the telephone, shopping, preparing meals, housekeeping, doing laundry, using transportation, managing medications and managing finances) [Smoke Detector] : smoke detector [Carbon Monoxide Detector] : carbon monoxide detector [Seat Belt] :  uses seat belt [Travel to Developing Areas] : travel to developing areas [Never] : Never [With Patient/Caregiver] : , with patient/caregiver [Aggressive treatment] : aggressive treatment [Audit-CScore] : 0 [de-identified] : none [[id number]] : 0 [Change in mental status noted] : No change in mental status noted [Language] : denies difficulty with language [Behavior] : denies difficulty with behavior [Learning/Retaining New Information] : denies difficulty learning/retaining new information [Handling Complex Tasks] : denies difficulty handling complex tasks [Reasoning] : denies difficulty with reasoning [Spatial Ability and Orientation] : denies difficulty with spatial ability and orientation [High Risk Behavior] : no high risk behavior [Reports changes in hearing] : Reports no changes in hearing [Reports changes in vision] : Reports no changes in vision [Reports changes in dental health] : Reports no changes in dental health [Guns at Home] : no guns at home [Sunscreen] : does not use sunscreen [TB Exposure] : is not being exposed to tuberculosis [Caregiver Concerns] : does not have caregiver concerns [ColonoscopyDate] : 09/2016 [ColonoscopyComments] :  return in 10 years [HIVDate] : 11/17 [HepatitisCDate] : 01/19 [de-identified] : full time [FreeTextEntry2] :  AND  [AdvancecareDate] : 04/22

## 2022-04-30 LAB
HBA1C MFR BLD HPLC: 7.6
HEMOCCULT STL QL IA: NEGATIVE
HEMOCCULT STL QL IA: NEGATIVE

## 2022-05-20 ENCOUNTER — RX RENEWAL (OUTPATIENT)
Age: 57
End: 2022-05-20

## 2022-06-24 ENCOUNTER — RX RENEWAL (OUTPATIENT)
Age: 57
End: 2022-06-24

## 2022-08-29 ENCOUNTER — APPOINTMENT (OUTPATIENT)
Dept: FAMILY MEDICINE | Facility: CLINIC | Age: 57
End: 2022-08-29

## 2022-10-04 ENCOUNTER — APPOINTMENT (OUTPATIENT)
Dept: FAMILY MEDICINE | Facility: CLINIC | Age: 57
End: 2022-10-04

## 2022-10-04 ENCOUNTER — RESULT CHARGE (OUTPATIENT)
Age: 57
End: 2022-10-04

## 2022-10-04 VITALS
TEMPERATURE: 98.2 F | WEIGHT: 181 LBS | DIASTOLIC BLOOD PRESSURE: 80 MMHG | RESPIRATION RATE: 16 BRPM | HEART RATE: 86 BPM | SYSTOLIC BLOOD PRESSURE: 126 MMHG | BODY MASS INDEX: 29.09 KG/M2 | HEIGHT: 66 IN | OXYGEN SATURATION: 98 %

## 2022-10-04 DIAGNOSIS — Z23 ENCOUNTER FOR IMMUNIZATION: ICD-10-CM

## 2022-10-04 PROCEDURE — 83036 HEMOGLOBIN GLYCOSYLATED A1C: CPT | Mod: QW

## 2022-10-04 PROCEDURE — 90686 IIV4 VACC NO PRSV 0.5 ML IM: CPT

## 2022-10-04 PROCEDURE — G0008: CPT

## 2022-10-04 PROCEDURE — 36415 COLL VENOUS BLD VENIPUNCTURE: CPT

## 2022-10-04 PROCEDURE — 99214 OFFICE O/P EST MOD 30 MIN: CPT | Mod: 25

## 2022-10-12 PROBLEM — Z23 NEEDS FLU SHOT: Status: ACTIVE | Noted: 2022-02-03

## 2022-10-12 LAB
ALBUMIN SERPL ELPH-MCNC: 5 G/DL
ALP BLD-CCNC: 109 U/L
ALT SERPL-CCNC: 64 U/L
ANION GAP SERPL CALC-SCNC: 12 MMOL/L
AST SERPL-CCNC: 33 U/L
BILIRUB SERPL-MCNC: 0.4 MG/DL
BUN SERPL-MCNC: 22 MG/DL
CALCIUM SERPL-MCNC: 9.7 MG/DL
CHLORIDE SERPL-SCNC: 102 MMOL/L
CO2 SERPL-SCNC: 27 MMOL/L
CREAT SERPL-MCNC: 0.97 MG/DL
EGFR: 92 ML/MIN/1.73M2
GLUCOSE SERPL-MCNC: 87 MG/DL
POTASSIUM SERPL-SCNC: 4.1 MMOL/L
PROT SERPL-MCNC: 7.9 G/DL
SODIUM SERPL-SCNC: 140 MMOL/L

## 2022-10-12 NOTE — HEALTH RISK ASSESSMENT
[Never] : Never [1 or 2 (0 pts)] : 1 or 2 (0 points) [Never (0 pts)] : Never (0 points) [No] : In the past 12 months have you used drugs other than those required for medical reasons? No [No falls in past year] : Patient reported no falls in the past year [0] : 2) Feeling down, depressed, or hopeless: Not at all (0) [PHQ-2 Negative - No further assessment needed] : PHQ-2 Negative - No further assessment needed [With Patient/Caregiver] : , with patient/caregiver [Aggressive treatment] : aggressive treatment [Audit-CScore] : 0 [de-identified] : none [[id number]] : 0 [AdvancecareDate] : 04/22

## 2022-10-12 NOTE — ASSESSMENT
[FreeTextEntry1] : This is a 54 y/o male with PMHx including HTN, HLD, DM2 uncontrolled, IRBBB, Covid-19 Illness by symptoms, Vitamin D insufficiency, presenting to the office for Diabetes follow up and CPE.\par \par Endo: h/o DM2 uncontrolled\par -HgbA1c  9.2, --> 8.6 --> 7.5 --> 7.4 --> 8.6 --> 7.6 --> 7.7 POCT today\par -Continue Pioglitazone 15 mg bid, Metformin 1000 mg bid, Januvia 100 mg daily, glipizide 5 mg, all refilled today.\par -Diet and exercise discussed\par -Blood sugar monitoring at least 3 x a week.\par \par CVS: h/o HTN, HLD, IRBBB\par -Blood pressure  optimal\par -Continue taking Losartan /25, e-refilled\par -Follows up with Dr Bui.\par -Continue Pravastatin 10 mg at bedtime\par -Pt underwent CT Angio on 4/5/19, found with nml coronaries, Calcium score 0.25\par -Echocardiogram performed March 2019, EF 57%, no significant valvular abnormality, Carotid Doppler with no significant stenosis.\par -Diet and exercise discussed.\par \par : h/o erectile dysfunction\par -Renewed Sildenafil 20 mg\par -Testosterone level and Prolactin levels wnl.\par \par HCM:\par -Blood work in house\par -Flu vaccine in house today\par -Received Tetanus Vaccine 11/17\par -Received Pneumonia Vaccine 6/17 (pneumovax)\par -Colonoscopy 9/2016, recommend repeat 2026\par -Ophthalmology for diabetic eye exam, 3/22, will request records\par -RTO in 3 months for follow up.\par -Covid vaccine completed, booster vaccine MODERNA

## 2022-10-12 NOTE — HISTORY OF PRESENT ILLNESS
[de-identified] : This is a 56 y/o male with PMHx including HTN, HLD, DM2 uncontrolled, IRBBB, Vitamin D insufficiency, COVID-19 illness by symptoms (3/28 - 4/20 on quarantine) presenting to the office for diabetes follow up and medication refills..

## 2023-01-09 ENCOUNTER — RX RENEWAL (OUTPATIENT)
Age: 58
End: 2023-01-09

## 2023-01-17 ENCOUNTER — APPOINTMENT (OUTPATIENT)
Dept: FAMILY MEDICINE | Facility: CLINIC | Age: 58
End: 2023-01-17
Payer: COMMERCIAL

## 2023-01-17 ENCOUNTER — RESULT CHARGE (OUTPATIENT)
Age: 58
End: 2023-01-17

## 2023-01-17 VITALS
TEMPERATURE: 97.9 F | DIASTOLIC BLOOD PRESSURE: 70 MMHG | BODY MASS INDEX: 29.41 KG/M2 | RESPIRATION RATE: 16 BRPM | HEIGHT: 66 IN | OXYGEN SATURATION: 98 % | WEIGHT: 183 LBS | HEART RATE: 85 BPM | SYSTOLIC BLOOD PRESSURE: 120 MMHG

## 2023-01-17 DIAGNOSIS — L30.9 DERMATITIS, UNSPECIFIED: ICD-10-CM

## 2023-01-17 PROCEDURE — 83036 HEMOGLOBIN GLYCOSYLATED A1C: CPT | Mod: QW

## 2023-01-17 PROCEDURE — 99214 OFFICE O/P EST MOD 30 MIN: CPT | Mod: 25

## 2023-01-17 RX ORDER — BLOOD-GLUCOSE METER
W/DEVICE EACH MISCELLANEOUS
Qty: 1 | Refills: 0 | Status: ACTIVE | COMMUNITY
Start: 2023-01-17 | End: 1900-01-01

## 2023-01-23 PROBLEM — L30.9 DERMATITIS: Status: ACTIVE | Noted: 2023-01-23

## 2023-01-23 NOTE — ASSESSMENT
[FreeTextEntry1] : This is a 52 y/o male with PMHx including HTN, HLD, DM2 uncontrolled, IRBBB, Covid-19 Illness by symptoms, Vitamin D insufficiency, presenting to the office for Diabetes follow up and CPE.\par \par Endo: h/o DM2 uncontrolled\par -HgbA1c  9.2, --> 8.6 --> 7.5 --> 7.4 --> 8.6 --> 7.6 --> 7.7 --> 8.4 POCT today\par -Continue Pioglitazone 15 mg bid which he has not been able to get from his insurance likely the reason his A1c is not improved, Metformin 1000 mg bid, Januvia 100 mg daily, glipizide 5 mg, all refilled today.\par -Diet and exercise discussed\par -Blood sugar monitoring at least 3 x a week.\par \par CVS: h/o HTN, HLD, IRBBB\par -Blood pressure  optimal\par -Continue taking Losartan /25.\par -Follows up with Dr Bui.\par -Continue Pravastatin 10 mg at bedtime\par -Pt underwent CT Angio on 4/5/19, found with nml coronaries, Calcium score 0.25\par -Echocardiogram performed March 2019, EF 57%, no significant valvular abnormality, Carotid Doppler with no significant stenosis.\par -Diet and exercise discussed.\par \par : h/o erectile dysfunction\par -Renewed Sildenafil 20 mg\par -Testosterone level and Prolactin levels wnl.\par \par HCM:\par -Flu vaccine 10/22\par -Received Tetanus Vaccine 11/17\par -Received Pneumonia Vaccine 6/17 (Pneumovax)\par -Colonoscopy 9/2016, recommend repeat 2026\par -Ophthalmology for diabetic eye exam, 3/22\par -RTO in 3 months for follow up.\par -Covid vaccine completed, booster vaccine MODERNA

## 2023-01-23 NOTE — HISTORY OF PRESENT ILLNESS
[FreeTextEntry1] : Diabetes check [de-identified] : This is a 58 y/o male with PMHx including HTN, HLD, DM2 uncontrolled, IRBBB, Vitamin D insufficiency, COVID-19 illness by symptoms (3/28 - 4/20 on quarantine) presenting to the office for diabetes follow up and medication refills. Pt offers no new cpomplains.

## 2023-01-23 NOTE — HEALTH RISK ASSESSMENT
[Never] : Never [1 or 2 (0 pts)] : 1 or 2 (0 points) [Never (0 pts)] : Never (0 points) [No] : In the past 12 months have you used drugs other than those required for medical reasons? No [No falls in past year] : Patient reported no falls in the past year [0] : 2) Feeling down, depressed, or hopeless: Not at all (0) [PHQ-2 Negative - No further assessment needed] : PHQ-2 Negative - No further assessment needed [With Patient/Caregiver] : , with patient/caregiver [Aggressive treatment] : aggressive treatment [Audit-CScore] : 0 [de-identified] : none [[id number]] : 0 [AdvancecareDate] : 04/22

## 2023-06-09 ENCOUNTER — APPOINTMENT (OUTPATIENT)
Dept: FAMILY MEDICINE | Facility: CLINIC | Age: 58
End: 2023-06-09
Payer: COMMERCIAL

## 2023-06-09 ENCOUNTER — RESULT CHARGE (OUTPATIENT)
Age: 58
End: 2023-06-09

## 2023-06-09 VITALS
TEMPERATURE: 98 F | OXYGEN SATURATION: 98 % | HEIGHT: 66 IN | SYSTOLIC BLOOD PRESSURE: 112 MMHG | BODY MASS INDEX: 28.77 KG/M2 | RESPIRATION RATE: 16 BRPM | WEIGHT: 179 LBS | HEART RATE: 78 BPM | DIASTOLIC BLOOD PRESSURE: 70 MMHG

## 2023-06-09 PROCEDURE — 99396 PREV VISIT EST AGE 40-64: CPT | Mod: 25

## 2023-06-09 PROCEDURE — 83036 HEMOGLOBIN GLYCOSYLATED A1C: CPT | Mod: QW

## 2023-06-09 NOTE — HISTORY OF PRESENT ILLNESS
[FreeTextEntry1] : Physical exam [de-identified] : This is a 59 y/o male with PMHx including HTN, HLD, DM2 uncontrolled, IRBBB, Vitamin D insufficiency, COVID-19 illness presenting to the office for  CPE. Pt offers no new complains, states he ran out of diabetes meds 2 months ago

## 2023-06-09 NOTE — COUNSELING
[Fall prevention counseling provided] : Fall prevention counseling provided [Adequate lighting] : Adequate lighting [No throw rugs] : No throw rugs [Use proper foot wear] : Use proper foot wear [Use recommended devices] : Use recommended devices [Behavioral health counseling provided] : Behavioral health counseling provided [Sleep ___ hours/day] : Sleep [unfilled] hours/day [Engage in a relaxing activity] : Engage in a relaxing activity [Plan in advance] : Plan in advance [AUDIT-C Screening administered and reviewed] : AUDIT-C Screening administered and reviewed [Potential consequences of obesity discussed] : Potential consequences of obesity discussed [Benefits of weight loss discussed] : Benefits of weight loss discussed [____ min/wk Activity] : [unfilled] min/wk activity [None] : None [Good understanding] : Patient has a good understanding of lifestyle changes and steps needed to achieve self management goal

## 2023-06-09 NOTE — ASSESSMENT
[FreeTextEntry1] : This is a 57 y/o male with PMHx including HTN, HLD, DM2 uncontrolled, IRBBB, Covid-19 Illness by symptoms, Vitamin D insufficiency, presenting to the office for Diabetes follow up and CPE.\par \par Endo: h/o DM2 uncontrolled\par -HgbA1c  9.2, --> 8.6 --> 7.5 --> 7.4 --> 8.6 --> 7.6 --> 7.7 --> 8.4 --> 12.4 POCT today\par -Pt had ru out of Glipizide, Metformin and Jardiance 2 months ago\par -Continue Pioglitazone 15 mg bid, renewed Metformin 1000 mg bid, Jardiance 25 mg daily, glipizide 5 mg, all refilled -Diet and exercise discussed\par -Blood sugar monitoring at least 3 x a week.\par \par CVS: h/o HTN, HLD, IRBBB\par -Blood pressure  optimal\par -Continue taking Losartan /25.\par -Follows up with Dr Bui.\par -Continue Pravastatin 10 mg at bedtime\par -Pt underwent CT Angio on 4/5/19, found with nml coronaries, Calcium score 0.25\par -Echocardiogram performed March 2019, EF 57%, no significant valvular abnormality, Carotid Doppler with no significant stenosis.\par -Diet and exercise discussed.\par \par : h/o erectile dysfunction\par -Renewed Sildenafil 20 mg\par -Testosterone level and Prolactin levels wnl.\par \par HCM:\par -Fasting blood work as outpt\par -Flu vaccine 10/22\par -Received Tetanus Vaccine 11/17\par -Received Pneumonia Vaccine 6/17 (Pneumovax)\par -Colonoscopy 9/2016, recommend repeat 2026\par -Ophthalmology for diabetic eye exam, 3/23\par -RTO in 3 months for follow up.\par -Covid vaccine completed, booster vaccine MODERNA

## 2023-06-09 NOTE — HEALTH RISK ASSESSMENT
[Good] : ~his/her~  mood as  good [1 or 2 (0 pts)] : 1 or 2 (0 points) [Never (0 pts)] : Never (0 points) [No] : In the past 12 months have you used drugs other than those required for medical reasons? No [No falls in past year] : Patient reported no falls in the past year [0] : 2) Feeling down, depressed, or hopeless: Not at all (0) [PHQ-2 Negative - No further assessment needed] : PHQ-2 Negative - No further assessment needed [None] : None [With Family] : lives with family [# of Members in Household ___] :  household currently consist of [unfilled] member(s) [Employed] : employed [High School] : high school [] :  [# Of Children ___] : has [unfilled] children [Sexually Active] : sexually active [Feels Safe at Home] : Feels safe at home [Fully functional (bathing, dressing, toileting, transferring, walking, feeding)] : Fully functional (bathing, dressing, toileting, transferring, walking, feeding) [Fully functional (using the telephone, shopping, preparing meals, housekeeping, doing laundry, using] : Fully functional and needs no help or supervision to perform IADLs (using the telephone, shopping, preparing meals, housekeeping, doing laundry, using transportation, managing medications and managing finances) [Smoke Detector] : smoke detector [Carbon Monoxide Detector] : carbon monoxide detector [Seat Belt] :  uses seat belt [Travel to Developing Areas] : travel to developing areas [With Patient/Caregiver] : , with patient/caregiver [Aggressive treatment] : aggressive treatment [Never] : Never [Audit-CScore] : 0 [de-identified] : none [de-identified] : low sweets and carbs [[id number]] : 0 [Change in mental status noted] : No change in mental status noted [Language] : denies difficulty with language [Behavior] : denies difficulty with behavior [Learning/Retaining New Information] : denies difficulty learning/retaining new information [Handling Complex Tasks] : denies difficulty handling complex tasks [Reasoning] : denies difficulty with reasoning [Spatial Ability and Orientation] : denies difficulty with spatial ability and orientation [High Risk Behavior] : no high risk behavior [Reports changes in hearing] : Reports no changes in hearing [Reports changes in vision] : Reports no changes in vision [Reports changes in dental health] : Reports no changes in dental health [Guns at Home] : no guns at home [Sunscreen] : does not use sunscreen [TB Exposure] : is not being exposed to tuberculosis [Caregiver Concerns] : does not have caregiver concerns [ColonoscopyDate] : 09/2016 [ColonoscopyComments] :  return in 10 years [HIVDate] : 11/17 [HepatitisCDate] : 01/19 [de-identified] : full time [FreeTextEntry2] :  AND  [AdvancecareDate] : 06/23

## 2023-06-12 LAB — HBA1C MFR BLD HPLC: 12.6

## 2023-06-13 ENCOUNTER — RX RENEWAL (OUTPATIENT)
Age: 58
End: 2023-06-13

## 2023-06-13 ENCOUNTER — RX CHANGE (OUTPATIENT)
Age: 58
End: 2023-06-13

## 2023-06-13 RX ORDER — PIOGLITAZONE HYDROCHLORIDE 15 MG/1
15 TABLET ORAL
Qty: 180 | Refills: 0 | Status: DISCONTINUED | COMMUNITY
Start: 2020-11-30 | End: 2023-06-13

## 2023-06-15 RX ORDER — PIOGLITAZONE HYDROCHLORIDE 30 MG/1
30 TABLET ORAL
Qty: 90 | Refills: 0 | Status: DISCONTINUED | COMMUNITY
Start: 2023-06-13 | End: 2023-06-15

## 2023-06-21 LAB — HEMOCCULT STL QL IA: NEGATIVE

## 2023-06-26 ENCOUNTER — RX RENEWAL (OUTPATIENT)
Age: 58
End: 2023-06-26

## 2023-06-26 RX ORDER — LANCETS 33 GAUGE
EACH MISCELLANEOUS
Qty: 1 | Refills: 2 | Status: ACTIVE | COMMUNITY
Start: 2023-06-26 | End: 1900-01-01

## 2023-07-06 ENCOUNTER — RX CHANGE (OUTPATIENT)
Age: 58
End: 2023-07-06

## 2023-07-06 RX ORDER — ERGOCALCIFEROL 1.25 MG/1
1.25 MG CAPSULE, LIQUID FILLED ORAL
Qty: 4 | Refills: 0 | Status: DISCONTINUED | COMMUNITY
Start: 2017-06-12 | End: 2023-07-06

## 2023-07-12 ENCOUNTER — RX RENEWAL (OUTPATIENT)
Age: 58
End: 2023-07-12

## 2023-08-02 ENCOUNTER — RX RENEWAL (OUTPATIENT)
Age: 58
End: 2023-08-02

## 2023-08-17 ENCOUNTER — RX RENEWAL (OUTPATIENT)
Age: 58
End: 2023-08-17

## 2023-08-17 RX ORDER — BLOOD SUGAR DIAGNOSTIC
STRIP MISCELLANEOUS TWICE DAILY
Qty: 100 | Refills: 3 | Status: ACTIVE | COMMUNITY
Start: 2023-01-23 | End: 1900-01-01

## 2023-09-02 ENCOUNTER — OFFICE (OUTPATIENT)
Dept: URBAN - METROPOLITAN AREA CLINIC 112 | Facility: CLINIC | Age: 58
Setting detail: OPHTHALMOLOGY
End: 2023-09-02
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
## 2023-09-02 PROCEDURE — NO SHOW FE NO SHOW FEE: Performed by: OPHTHALMOLOGY

## 2023-09-26 ENCOUNTER — RX CHANGE (OUTPATIENT)
Age: 58
End: 2023-09-26

## 2023-10-02 ENCOUNTER — RX RENEWAL (OUTPATIENT)
Age: 58
End: 2023-10-02

## 2023-10-12 ENCOUNTER — RX RENEWAL (OUTPATIENT)
Age: 58
End: 2023-10-12

## 2023-10-26 ENCOUNTER — RX CHANGE (OUTPATIENT)
Age: 58
End: 2023-10-26

## 2023-10-26 ENCOUNTER — NON-APPOINTMENT (OUTPATIENT)
Age: 58
End: 2023-10-26

## 2023-10-27 ENCOUNTER — APPOINTMENT (OUTPATIENT)
Dept: FAMILY MEDICINE | Facility: CLINIC | Age: 58
End: 2023-10-27
Payer: COMMERCIAL

## 2023-10-27 VITALS
RESPIRATION RATE: 14 BRPM | HEART RATE: 69 BPM | TEMPERATURE: 98 F | SYSTOLIC BLOOD PRESSURE: 128 MMHG | BODY MASS INDEX: 29.57 KG/M2 | WEIGHT: 184 LBS | DIASTOLIC BLOOD PRESSURE: 82 MMHG | HEIGHT: 66 IN | OXYGEN SATURATION: 98 %

## 2023-10-27 DIAGNOSIS — I45.10 UNSPECIFIED RIGHT BUNDLE-BRANCH BLOCK: ICD-10-CM

## 2023-10-27 PROCEDURE — 99214 OFFICE O/P EST MOD 30 MIN: CPT | Mod: 25

## 2023-10-27 PROCEDURE — 83036 HEMOGLOBIN GLYCOSYLATED A1C: CPT | Mod: QW

## 2023-10-30 ENCOUNTER — RESULT CHARGE (OUTPATIENT)
Age: 58
End: 2023-10-30

## 2023-11-30 ENCOUNTER — RX RENEWAL (OUTPATIENT)
Age: 58
End: 2023-11-30

## 2024-01-03 ENCOUNTER — RX RENEWAL (OUTPATIENT)
Age: 59
End: 2024-01-03

## 2024-01-09 ENCOUNTER — RX RENEWAL (OUTPATIENT)
Age: 59
End: 2024-01-09

## 2024-01-12 ENCOUNTER — RX RENEWAL (OUTPATIENT)
Age: 59
End: 2024-01-12

## 2024-02-02 ENCOUNTER — APPOINTMENT (OUTPATIENT)
Dept: FAMILY MEDICINE | Facility: CLINIC | Age: 59
End: 2024-02-02
Payer: COMMERCIAL

## 2024-02-02 VITALS
RESPIRATION RATE: 14 BRPM | HEIGHT: 66 IN | DIASTOLIC BLOOD PRESSURE: 66 MMHG | WEIGHT: 179.06 LBS | SYSTOLIC BLOOD PRESSURE: 114 MMHG | OXYGEN SATURATION: 97 % | TEMPERATURE: 98.6 F | HEART RATE: 85 BPM | BODY MASS INDEX: 28.78 KG/M2

## 2024-02-02 DIAGNOSIS — I10 ESSENTIAL (PRIMARY) HYPERTENSION: ICD-10-CM

## 2024-02-02 DIAGNOSIS — N52.9 MALE ERECTILE DYSFUNCTION, UNSPECIFIED: ICD-10-CM

## 2024-02-02 DIAGNOSIS — E11.9 TYPE 2 DIABETES MELLITUS W/OUT COMPLICATIONS: ICD-10-CM

## 2024-02-02 DIAGNOSIS — E55.9 VITAMIN D DEFICIENCY, UNSPECIFIED: ICD-10-CM

## 2024-02-02 DIAGNOSIS — Z79.899 OTHER LONG TERM (CURRENT) DRUG THERAPY: ICD-10-CM

## 2024-02-02 DIAGNOSIS — E78.5 HYPERLIPIDEMIA, UNSPECIFIED: ICD-10-CM

## 2024-02-02 PROCEDURE — 83036 HEMOGLOBIN GLYCOSYLATED A1C: CPT | Mod: QW

## 2024-02-02 PROCEDURE — 99214 OFFICE O/P EST MOD 30 MIN: CPT

## 2024-02-02 RX ORDER — GLIPIZIDE 5 MG/1
5 TABLET ORAL TWICE DAILY
Qty: 180 | Refills: 1 | Status: ACTIVE | COMMUNITY
Start: 2020-12-01 | End: 1900-01-01

## 2024-02-06 ENCOUNTER — RESULT CHARGE (OUTPATIENT)
Age: 59
End: 2024-02-06

## 2024-02-06 PROBLEM — E55.9 VITAMIN D INSUFFICIENCY: Status: ACTIVE | Noted: 2017-06-12

## 2024-02-06 NOTE — ASSESSMENT
[FreeTextEntry1] :  59 y/o male with PMHx including HTN, HLD, DM2 uncontrolled, IRBBB, Covid-19 Illness by symptoms, Vitamin D insufficiency, presenting to the office for Diabetes follow up.  Endo: h/o DM2 uncontrolled -HgbA1c 8.6 --> 7.6 --> 7.7 --> 8.4 --> 12.4 --> 7.0 --> 7.2 POCT today -Continue Pioglitazone 15 mg bid, renewed Metformin 1000 mg bid, Jardiance 25 mg daily, glipizide 5 mg, -Diet and exercise discussed -Blood sugar monitoring at least 3 x a week.  CVS: h/o HTN, HLD, IRBBB -Blood pressure well controlled -Continue taking Losartan /25. -Follows up with Dr Bui. -Continue Pravastatin 10 mg at bedtime -Pt underwent CT Angio on 4/5/19, found with nml coronaries, Calcium score 0.25 -Echocardiogram performed March 2019, EF 57%, no significant valvular abnormality, Carotid Doppler with no significant stenosis. -Diet and exercise discussed.  : h/o erectile dysfunction -Renewed Sildenafil 20 mg -Testosterone level and Prolactin levels wnl.  HCM: -Flu vaccine reportedly in Sept 2023 at local pharmacy -Received Tetanus Vaccine 11/17 -Received Pneumonia Vaccine 6/17 (Pneumovax) -Colonoscopy 9/2016, recommend repeat 2026 -Ophthalmology for diabetic eye exam, 3/23 -RTO in June for CPE. -Covid vaccine completed, booster vaccine MODERNA.

## 2024-02-06 NOTE — HEALTH RISK ASSESSMENT
[1 or 2 (0 pts)] : 1 or 2 (0 points) [Never (0 pts)] : Never (0 points) [No] : In the past 12 months have you used drugs other than those required for medical reasons? No [No falls in past year] : Patient reported no falls in the past year [0] : 2) Feeling down, depressed, or hopeless: Not at all (0) [PHQ-2 Negative - No further assessment needed] : PHQ-2 Negative - No further assessment needed [With Patient/Caregiver] : , with patient/caregiver [Aggressive treatment] : aggressive treatment [Never] : Never [Audit-CScore] : 0 [de-identified] : none [de-identified] : low sweets and carbs [[id number]] : 0 [AdvancecareDate] : 06/23

## 2024-02-06 NOTE — HISTORY OF PRESENT ILLNESS
[FreeTextEntry1] : Diabetes follow up [de-identified] : This is a 59 y/o male with PMHx including HTN, HLD, DM2 uncontrolled, IRBBB, Vitamin D insufficiency, COVID-19 illness presenting to the office for diabetes check. Pt offers no new complains.

## 2024-04-04 ENCOUNTER — RX RENEWAL (OUTPATIENT)
Age: 59
End: 2024-04-04

## 2024-04-06 ENCOUNTER — OFFICE (OUTPATIENT)
Dept: URBAN - METROPOLITAN AREA CLINIC 112 | Facility: CLINIC | Age: 59
Setting detail: OPHTHALMOLOGY
End: 2024-04-06
Payer: COMMERCIAL

## 2024-04-06 DIAGNOSIS — H35.033: ICD-10-CM

## 2024-04-06 PROCEDURE — 92250 FUNDUS PHOTOGRAPHY W/I&R: CPT | Performed by: OPHTHALMOLOGY

## 2024-04-06 PROCEDURE — 92014 COMPRE OPH EXAM EST PT 1/>: CPT | Performed by: OPHTHALMOLOGY

## 2024-04-23 ENCOUNTER — RX RENEWAL (OUTPATIENT)
Age: 59
End: 2024-04-23

## 2024-04-23 RX ORDER — PIOGLITAZONE HYDROCHLORIDE 30 MG/1
30 TABLET ORAL
Qty: 90 | Refills: 1 | Status: ACTIVE | COMMUNITY
Start: 2023-06-15 | End: 1900-01-01

## 2024-05-24 ENCOUNTER — RX RENEWAL (OUTPATIENT)
Age: 59
End: 2024-05-24

## 2024-05-28 ENCOUNTER — RX RENEWAL (OUTPATIENT)
Age: 59
End: 2024-05-28

## 2024-05-30 ENCOUNTER — RX RENEWAL (OUTPATIENT)
Age: 59
End: 2024-05-30

## 2024-05-30 RX ORDER — METFORMIN HYDROCHLORIDE 1000 MG/1
1000 TABLET, COATED ORAL
Qty: 180 | Refills: 1 | Status: ACTIVE | COMMUNITY
Start: 2024-05-30 | End: 1900-01-01

## 2024-06-13 ENCOUNTER — NON-APPOINTMENT (OUTPATIENT)
Age: 59
End: 2024-06-13

## 2024-06-14 ENCOUNTER — APPOINTMENT (OUTPATIENT)
Dept: FAMILY MEDICINE | Facility: CLINIC | Age: 59
End: 2024-06-14
Payer: COMMERCIAL

## 2024-06-14 VITALS
BODY MASS INDEX: 28.93 KG/M2 | SYSTOLIC BLOOD PRESSURE: 126 MMHG | WEIGHT: 180 LBS | OXYGEN SATURATION: 98 % | RESPIRATION RATE: 15 BRPM | HEART RATE: 80 BPM | TEMPERATURE: 96.9 F | DIASTOLIC BLOOD PRESSURE: 74 MMHG | HEIGHT: 66 IN

## 2024-06-14 DIAGNOSIS — R53.83 OTHER MALAISE: ICD-10-CM

## 2024-06-14 DIAGNOSIS — R53.81 OTHER MALAISE: ICD-10-CM

## 2024-06-14 DIAGNOSIS — Z12.5 ENCOUNTER FOR SCREENING FOR MALIGNANT NEOPLASM OF PROSTATE: ICD-10-CM

## 2024-06-14 DIAGNOSIS — Z00.00 ENCOUNTER FOR GENERAL ADULT MEDICAL EXAMINATION W/OUT ABNORMAL FINDINGS: ICD-10-CM

## 2024-06-14 DIAGNOSIS — R06.83 SNORING: ICD-10-CM

## 2024-06-14 LAB
ALBUMIN SERPL ELPH-MCNC: 4.9 G/DL
ALP BLD-CCNC: 82 U/L
ALT SERPL-CCNC: 27 U/L
ANION GAP SERPL CALC-SCNC: 14 MMOL/L
AST SERPL-CCNC: 21 U/L
BILIRUB SERPL-MCNC: 0.5 MG/DL
BUN SERPL-MCNC: 26 MG/DL
CALCIUM SERPL-MCNC: 9.7 MG/DL
CHLORIDE SERPL-SCNC: 99 MMOL/L
CHOLEST SERPL-MCNC: 219 MG/DL
CO2 SERPL-SCNC: 24 MMOL/L
CREAT SERPL-MCNC: 0.96 MG/DL
CREAT SPEC-SCNC: 126 MG/DL
EGFR: 92 ML/MIN/1.73M2
GLUCOSE SERPL-MCNC: 111 MG/DL
HBA1C MFR BLD HPLC: 7.7
HCT VFR BLD CALC: 47.4 %
HDLC SERPL-MCNC: 48 MG/DL
HGB BLD-MCNC: 16.2 G/DL
LDLC SERPL CALC-MCNC: 156 MG/DL
MCHC RBC-ENTMCNC: 30 PG
MCHC RBC-ENTMCNC: 34.2 GM/DL
MCV RBC AUTO: 87.8 FL
MICROALBUMIN 24H UR DL<=1MG/L-MCNC: <1.2 MG/DL
MICROALBUMIN/CREAT 24H UR-RTO: NORMAL MG/G
NONHDLC SERPL-MCNC: 171 MG/DL
PLATELET # BLD AUTO: 199 K/UL
POTASSIUM SERPL-SCNC: 4.3 MMOL/L
PROT SERPL-MCNC: 7.9 G/DL
PSA SERPL-MCNC: 0.37 NG/ML
RBC # BLD: 5.4 M/UL
RBC # FLD: 13.5 %
SODIUM SERPL-SCNC: 136 MMOL/L
TESTOST SERPL-MCNC: 441 NG/DL
TRIGL SERPL-MCNC: 84 MG/DL
TSH SERPL-ACNC: 1.37 UIU/ML
WBC # FLD AUTO: 7.9 K/UL

## 2024-06-14 PROCEDURE — 36415 COLL VENOUS BLD VENIPUNCTURE: CPT

## 2024-06-14 PROCEDURE — 99396 PREV VISIT EST AGE 40-64: CPT

## 2024-06-14 PROCEDURE — 83036 HEMOGLOBIN GLYCOSYLATED A1C: CPT | Mod: QW

## 2024-06-14 RX ORDER — ERGOCALCIFEROL 1.25 MG/1
1.25 MG CAPSULE, LIQUID FILLED ORAL
Qty: 12 | Refills: 1 | Status: ACTIVE | COMMUNITY
Start: 2023-07-06 | End: 1900-01-01

## 2024-06-14 RX ORDER — EMPAGLIFLOZIN 25 MG/1
25 TABLET, FILM COATED ORAL
Qty: 90 | Refills: 1 | Status: ACTIVE | COMMUNITY
Start: 2019-04-13 | End: 1900-01-01

## 2024-06-14 RX ORDER — METFORMIN HYDROCHLORIDE 1000 MG/1
1000 TABLET, COATED ORAL
Qty: 180 | Refills: 1 | Status: DISCONTINUED | COMMUNITY
Start: 2020-11-30 | End: 2024-06-14

## 2024-06-14 NOTE — HEALTH RISK ASSESSMENT
[Good] : ~his/her~  mood as  good [1 or 2 (0 pts)] : 1 or 2 (0 points) [Never (0 pts)] : Never (0 points) [No] : In the past 12 months have you used drugs other than those required for medical reasons? No [No falls in past year] : Patient reported no falls in the past year [0] : 2) Feeling down, depressed, or hopeless: Not at all (0) [PHQ-2 Negative - No further assessment needed] : PHQ-2 Negative - No further assessment needed [Never] : Never [None] : None [With Family] : lives with family [# of Members in Household ___] :  household currently consist of [unfilled] member(s) [Employed] : employed [High School] : high school [] :  [# Of Children ___] : has [unfilled] children [Sexually Active] : sexually active [Feels Safe at Home] : Feels safe at home [Fully functional (bathing, dressing, toileting, transferring, walking, feeding)] : Fully functional (bathing, dressing, toileting, transferring, walking, feeding) [Fully functional (using the telephone, shopping, preparing meals, housekeeping, doing laundry, using] : Fully functional and needs no help or supervision to perform IADLs (using the telephone, shopping, preparing meals, housekeeping, doing laundry, using transportation, managing medications and managing finances) [Smoke Detector] : smoke detector [Carbon Monoxide Detector] : carbon monoxide detector [Seat Belt] :  uses seat belt [Travel to Developing Areas] : travel to developing areas [With Patient/Caregiver] : , with patient/caregiver [Aggressive treatment] : aggressive treatment [de-identified] : none [Audit-CScore] : 0 [de-identified] : low sweets and carbs [[id number]] : 0 [Change in mental status noted] : No change in mental status noted [Language] : denies difficulty with language [Behavior] : denies difficulty with behavior [Learning/Retaining New Information] : denies difficulty learning/retaining new information [Handling Complex Tasks] : denies difficulty handling complex tasks [Reasoning] : denies difficulty with reasoning [Spatial Ability and Orientation] : denies difficulty with spatial ability and orientation [High Risk Behavior] : no high risk behavior [Reports changes in hearing] : Reports no changes in hearing [Reports changes in vision] : Reports no changes in vision [Reports changes in dental health] : Reports no changes in dental health [Sunscreen] : does not use sunscreen [TB Exposure] : is not being exposed to tuberculosis [Caregiver Concerns] : does not have caregiver concerns [ColonoscopyDate] : 09/2016 [ColonoscopyComments] :  return in 10 years [HIVDate] : 11/17 [HepatitisCDate] : 01/19 [de-identified] : full time [FreeTextEntry2] :  AND  [AdvancecareDate] : 06/23

## 2024-06-14 NOTE — ASSESSMENT
[FreeTextEntry1] :  60 y/o male with PMHx including HTN, HLD, DM2 uncontrolled, IRBBB, Covid-19 Illness by symptoms, Vitamin D insufficiency, presenting to the office for Diabetes follow up.  Endo: h/o DM2 uncontrolled -HgbA1c 8.6 --> 7.6 --> 7.7 --> 8.4 --> 12.4 --> 7.0 --> 7.2 --> 7.7 POCT today. -Continue Pioglitazone 15 mg bid, renewed Metformin 1000 mg bid, Jardiance 25 mg daily, glipizide 5 mg, -Diet and exercise discussed. -Blood sugar monitoring at least 3 x a week.  [facility]: h/o HTN, HLD, IRBBB -Blood pressure well controlled -Continue taking Losartan /25. -Follows up with Dr Bui. -Continue Pravastatin 10 mg at bedtime -Pt underwent CT Angio on 4/5/19, found with nml coronaries, Calcium score 0.25 -Echocardiogram performed March 2019, EF 57%, no significant valvular abnormality, Carotid Doppler with no significant stenosis. -Diet and exercise discussed.  : h/o erectile dysfunction -Renewed Sildenafil 20 mg -Testosterone level and Prolactin levels wnl.  HCM: -Fasting blood work in house -Sleep studies referral provided -Flu vaccine reportedly in Sept 2023 at local pharmacy -Received Tetanus Vaccine 11/17 -Received Pneumonia Vaccine 6/17 (Pneumovax) -Colonoscopy 9/2016, recommend repeat 2026 -Ophthalmology for diabetic eye exam, 04/24, no DR -Covid vaccine completed, booster vaccine MODERNA.

## 2024-06-14 NOTE — HISTORY OF PRESENT ILLNESS
[FreeTextEntry1] : Physical exam [de-identified] : This is a 58 y/o male with PMHx including HTN, HLD, DM2 uncontrolled, IRBBB, Vitamin D insufficiency, COVID-19 illness presenting to the office for diabetes check. Pt offers no new complains.

## 2024-06-15 LAB
APPEARANCE: CLEAR
BACTERIA: NEGATIVE /HPF
BILIRUBIN URINE: NEGATIVE
BLOOD URINE: NEGATIVE
CAST: 0 /LPF
COLOR: YELLOW
EPITHELIAL CELLS: 0 /HPF
GLUCOSE QUALITATIVE U: >=1000 MG/DL
KETONES URINE: ABNORMAL MG/DL
LEUKOCYTE ESTERASE URINE: NEGATIVE
MICROSCOPIC-UA: NORMAL
NITRITE URINE: NEGATIVE
PH URINE: 5.5
PROTEIN URINE: NEGATIVE MG/DL
RED BLOOD CELLS URINE: 3 /HPF
SPECIFIC GRAVITY URINE: >1.03
UROBILINOGEN URINE: 0.2 MG/DL
WHITE BLOOD CELLS URINE: 0 /HPF

## 2024-06-25 ENCOUNTER — RX RENEWAL (OUTPATIENT)
Age: 59
End: 2024-06-25

## 2024-06-25 RX ORDER — SILDENAFIL 20 MG/1
20 TABLET ORAL
Qty: 30 | Refills: 0 | Status: ACTIVE | COMMUNITY
Start: 2018-03-12 | End: 1900-01-01

## 2024-07-01 ENCOUNTER — RX RENEWAL (OUTPATIENT)
Age: 59
End: 2024-07-01

## 2024-07-23 ENCOUNTER — RX RENEWAL (OUTPATIENT)
Age: 59
End: 2024-07-23

## 2024-07-24 RX ORDER — ATORVASTATIN CALCIUM 20 MG/1
20 TABLET, FILM COATED ORAL
Qty: 1 | Refills: 1 | Status: ACTIVE | COMMUNITY
Start: 2024-07-24 | End: 1900-01-01

## 2024-07-25 ENCOUNTER — RX RENEWAL (OUTPATIENT)
Age: 59
End: 2024-07-25

## 2024-07-26 RX ORDER — CLOTRIMAZOLE 10 MG/G
1 CREAM TOPICAL TWICE DAILY
Qty: 1 | Refills: 0 | Status: ACTIVE | COMMUNITY
Start: 2024-07-26 | End: 1900-01-01

## 2024-07-26 RX ORDER — BETAMETHASONE DIPROPIONATE 0.5 MG/G
0.05 OINTMENT TOPICAL DAILY
Qty: 1 | Refills: 0 | Status: ACTIVE | COMMUNITY
Start: 2024-07-26 | End: 1900-01-01

## 2024-08-05 ENCOUNTER — RX RENEWAL (OUTPATIENT)
Age: 59
End: 2024-08-05

## 2024-09-24 ENCOUNTER — OFFICE (OUTPATIENT)
Dept: URBAN - METROPOLITAN AREA CLINIC 94 | Facility: CLINIC | Age: 59
Setting detail: OPHTHALMOLOGY
End: 2024-09-24
Payer: COMMERCIAL

## 2024-09-24 DIAGNOSIS — H35.033: ICD-10-CM

## 2024-09-24 DIAGNOSIS — H35.373: ICD-10-CM

## 2024-09-24 DIAGNOSIS — E11.3293: ICD-10-CM

## 2024-09-24 PROCEDURE — 92012 INTRM OPH EXAM EST PATIENT: CPT | Performed by: OPHTHALMOLOGY

## 2024-09-24 PROCEDURE — 92134 CPTRZ OPH DX IMG PST SGM RTA: CPT | Performed by: OPHTHALMOLOGY

## 2024-09-24 PROCEDURE — 92235 FLUORESCEIN ANGRPH MLTIFRAME: CPT | Performed by: OPHTHALMOLOGY

## 2024-09-24 ASSESSMENT — CONFRONTATIONAL VISUAL FIELD TEST (CVF)
OD_FINDINGS: FULL
OS_FINDINGS: FULL

## 2024-10-18 ENCOUNTER — RX RENEWAL (OUTPATIENT)
Age: 59
End: 2024-10-18

## 2024-11-23 ENCOUNTER — RX RENEWAL (OUTPATIENT)
Age: 59
End: 2024-11-23

## 2024-12-10 ENCOUNTER — RX RENEWAL (OUTPATIENT)
Age: 59
End: 2024-12-10

## 2025-01-07 ENCOUNTER — APPOINTMENT (OUTPATIENT)
Dept: FAMILY MEDICINE | Facility: CLINIC | Age: 60
End: 2025-01-07

## 2025-01-07 VITALS
RESPIRATION RATE: 14 BRPM | HEART RATE: 70 BPM | SYSTOLIC BLOOD PRESSURE: 128 MMHG | TEMPERATURE: 97.2 F | BODY MASS INDEX: 29.57 KG/M2 | DIASTOLIC BLOOD PRESSURE: 70 MMHG | HEIGHT: 66 IN | OXYGEN SATURATION: 98 % | WEIGHT: 184 LBS

## 2025-01-07 DIAGNOSIS — I10 ESSENTIAL (PRIMARY) HYPERTENSION: ICD-10-CM

## 2025-01-07 DIAGNOSIS — Z79.899 OTHER LONG TERM (CURRENT) DRUG THERAPY: ICD-10-CM

## 2025-01-07 DIAGNOSIS — E78.5 HYPERLIPIDEMIA, UNSPECIFIED: ICD-10-CM

## 2025-01-07 DIAGNOSIS — E55.9 VITAMIN D DEFICIENCY, UNSPECIFIED: ICD-10-CM

## 2025-01-07 DIAGNOSIS — E11.9 TYPE 2 DIABETES MELLITUS W/OUT COMPLICATIONS: ICD-10-CM

## 2025-01-07 LAB — HBA1C MFR BLD HPLC: 8

## 2025-01-07 PROCEDURE — 83036 HEMOGLOBIN GLYCOSYLATED A1C: CPT | Mod: QW

## 2025-01-07 PROCEDURE — 90656 IIV3 VACC NO PRSV 0.5 ML IM: CPT

## 2025-01-07 PROCEDURE — G0008: CPT

## 2025-01-07 PROCEDURE — 99214 OFFICE O/P EST MOD 30 MIN: CPT | Mod: 25

## 2025-01-07 PROCEDURE — G2211 COMPLEX E/M VISIT ADD ON: CPT | Mod: NC

## 2025-01-07 PROCEDURE — 36415 COLL VENOUS BLD VENIPUNCTURE: CPT

## 2025-01-09 LAB
ALBUMIN SERPL ELPH-MCNC: 4.5 G/DL
ALP BLD-CCNC: 93 U/L
ALT SERPL-CCNC: 25 U/L
ANION GAP SERPL CALC-SCNC: 15 MMOL/L
AST SERPL-CCNC: 21 U/L
BILIRUB SERPL-MCNC: 0.3 MG/DL
BUN SERPL-MCNC: 16 MG/DL
CALCIUM SERPL-MCNC: 9.9 MG/DL
CHLORIDE SERPL-SCNC: 100 MMOL/L
CO2 SERPL-SCNC: 26 MMOL/L
CREAT SERPL-MCNC: 0.83 MG/DL
EGFR: 101 ML/MIN/1.73M2
GLUCOSE SERPL-MCNC: 113 MG/DL
POTASSIUM SERPL-SCNC: 3.9 MMOL/L
PROT SERPL-MCNC: 7.8 G/DL
SODIUM SERPL-SCNC: 141 MMOL/L

## 2025-01-17 LAB — HEMOCCULT STL QL IA: NEGATIVE

## 2025-03-17 ENCOUNTER — RX RENEWAL (OUTPATIENT)
Age: 60
End: 2025-03-17

## 2025-03-18 ENCOUNTER — RX RENEWAL (OUTPATIENT)
Age: 60
End: 2025-03-18

## 2025-03-18 RX ORDER — GLIPIZIDE 5 MG/1
5 TABLET ORAL
Qty: 180 | Refills: 0 | Status: ACTIVE | COMMUNITY
Start: 2025-03-17 | End: 1900-01-01

## 2025-03-25 ENCOUNTER — OFFICE (OUTPATIENT)
Dept: URBAN - METROPOLITAN AREA CLINIC 94 | Facility: CLINIC | Age: 60
Setting detail: OPHTHALMOLOGY
End: 2025-03-25
Payer: COMMERCIAL

## 2025-03-25 DIAGNOSIS — E11.3293: ICD-10-CM

## 2025-03-25 DIAGNOSIS — H35.033: ICD-10-CM

## 2025-03-25 PROCEDURE — 92235 FLUORESCEIN ANGRPH MLTIFRAME: CPT | Performed by: OPHTHALMOLOGY

## 2025-03-25 PROCEDURE — 92014 COMPRE OPH EXAM EST PT 1/>: CPT | Performed by: OPHTHALMOLOGY

## 2025-03-25 PROCEDURE — 92134 CPTRZ OPH DX IMG PST SGM RTA: CPT | Performed by: OPHTHALMOLOGY

## 2025-03-25 ASSESSMENT — KERATOMETRY
OS_AXISANGLE_DEGREES: 082
OD_K2POWER_DIOPTERS: 40.75
OD_AXISANGLE_DEGREES: 090
OD_K1POWER_DIOPTERS: 40.75
OS_K2POWER_DIOPTERS: 40.75
OS_K1POWER_DIOPTERS: 40.25

## 2025-03-25 ASSESSMENT — REFRACTION_AUTOREFRACTION
OS_AXIS: 109
OS_SPHERE: +2.50
OS_CYLINDER: -0.75
OD_AXIS: 077
OD_CYLINDER: -0.75
OD_SPHERE: +3.75

## 2025-03-25 ASSESSMENT — VISUAL ACUITY
OD_BCVA: 20/25
OS_BCVA: 20/30

## 2025-03-25 ASSESSMENT — CONFRONTATIONAL VISUAL FIELD TEST (CVF)
OD_FINDINGS: FULL
OS_FINDINGS: FULL

## 2025-04-11 ENCOUNTER — APPOINTMENT (OUTPATIENT)
Dept: FAMILY MEDICINE | Facility: CLINIC | Age: 60
End: 2025-04-11
Payer: COMMERCIAL

## 2025-04-11 VITALS
BODY MASS INDEX: 28.28 KG/M2 | DIASTOLIC BLOOD PRESSURE: 78 MMHG | HEIGHT: 66 IN | TEMPERATURE: 97.6 F | OXYGEN SATURATION: 99 % | HEART RATE: 78 BPM | SYSTOLIC BLOOD PRESSURE: 110 MMHG | RESPIRATION RATE: 14 BRPM | WEIGHT: 176 LBS

## 2025-04-11 DIAGNOSIS — E78.5 HYPERLIPIDEMIA, UNSPECIFIED: ICD-10-CM

## 2025-04-11 DIAGNOSIS — N52.9 MALE ERECTILE DYSFUNCTION, UNSPECIFIED: ICD-10-CM

## 2025-04-11 DIAGNOSIS — E55.9 VITAMIN D DEFICIENCY, UNSPECIFIED: ICD-10-CM

## 2025-04-11 DIAGNOSIS — E11.9 TYPE 2 DIABETES MELLITUS W/OUT COMPLICATIONS: ICD-10-CM

## 2025-04-11 DIAGNOSIS — Z79.899 OTHER LONG TERM (CURRENT) DRUG THERAPY: ICD-10-CM

## 2025-04-11 DIAGNOSIS — I45.10 UNSPECIFIED RIGHT BUNDLE-BRANCH BLOCK: ICD-10-CM

## 2025-04-11 PROCEDURE — G2211 COMPLEX E/M VISIT ADD ON: CPT | Mod: NC

## 2025-04-11 PROCEDURE — 83036 HEMOGLOBIN GLYCOSYLATED A1C: CPT | Mod: QW

## 2025-04-11 PROCEDURE — 99214 OFFICE O/P EST MOD 30 MIN: CPT

## 2025-04-14 LAB — HBA1C MFR BLD HPLC: 8

## 2025-07-08 ENCOUNTER — RX RENEWAL (OUTPATIENT)
Age: 60
End: 2025-07-08

## 2025-07-14 ENCOUNTER — APPOINTMENT (OUTPATIENT)
Dept: FAMILY MEDICINE | Facility: CLINIC | Age: 60
End: 2025-07-14
Payer: COMMERCIAL

## 2025-07-14 ENCOUNTER — LABORATORY RESULT (OUTPATIENT)
Age: 60
End: 2025-07-14

## 2025-07-14 VITALS
HEART RATE: 79 BPM | DIASTOLIC BLOOD PRESSURE: 60 MMHG | WEIGHT: 185 LBS | TEMPERATURE: 97.8 F | BODY MASS INDEX: 29.73 KG/M2 | SYSTOLIC BLOOD PRESSURE: 120 MMHG | HEIGHT: 66 IN | OXYGEN SATURATION: 97 % | RESPIRATION RATE: 14 BRPM

## 2025-07-14 PROCEDURE — G2211 COMPLEX E/M VISIT ADD ON: CPT | Mod: NC

## 2025-07-14 PROCEDURE — 36415 COLL VENOUS BLD VENIPUNCTURE: CPT

## 2025-07-14 PROCEDURE — 83036 HEMOGLOBIN GLYCOSYLATED A1C: CPT | Mod: QW

## 2025-07-14 PROCEDURE — 99214 OFFICE O/P EST MOD 30 MIN: CPT

## 2025-07-22 LAB
ALBUMIN SERPL ELPH-MCNC: 4.6 G/DL
ALP BLD-CCNC: 74 U/L
ALT SERPL-CCNC: 29 U/L
ANION GAP SERPL CALC-SCNC: 15 MMOL/L
AST SERPL-CCNC: 23 U/L
BILIRUB SERPL-MCNC: 0.3 MG/DL
BUN SERPL-MCNC: 23 MG/DL
CALCIUM SERPL-MCNC: 9.7 MG/DL
CHLORIDE SERPL-SCNC: 101 MMOL/L
CO2 SERPL-SCNC: 23 MMOL/L
CREAT SERPL-MCNC: 1.13 MG/DL
EGFRCR SERPLBLD CKD-EPI 2021: 75 ML/MIN/1.73M2
GLUCOSE SERPL-MCNC: 85 MG/DL
POTASSIUM SERPL-SCNC: 3.9 MMOL/L
PROT SERPL-MCNC: 7.5 G/DL
SODIUM SERPL-SCNC: 139 MMOL/L

## 2025-08-29 ENCOUNTER — RX RENEWAL (OUTPATIENT)
Age: 60
End: 2025-08-29

## 2025-09-02 ENCOUNTER — RX RENEWAL (OUTPATIENT)
Age: 60
End: 2025-09-02

## 2025-09-10 ENCOUNTER — RX RENEWAL (OUTPATIENT)
Age: 60
End: 2025-09-10